# Patient Record
Sex: MALE | Race: WHITE | Employment: OTHER | URBAN - METROPOLITAN AREA
[De-identification: names, ages, dates, MRNs, and addresses within clinical notes are randomized per-mention and may not be internally consistent; named-entity substitution may affect disease eponyms.]

---

## 2017-08-17 ENCOUNTER — TELEPHONE (OUTPATIENT)
Dept: SURGERY | Facility: CLINIC | Age: 27
End: 2017-08-17

## 2017-08-17 ENCOUNTER — OFFICE VISIT (OUTPATIENT)
Dept: SURGERY | Facility: CLINIC | Age: 27
End: 2017-08-17

## 2017-08-17 VITALS
DIASTOLIC BLOOD PRESSURE: 70 MMHG | SYSTOLIC BLOOD PRESSURE: 108 MMHG | HEART RATE: 80 BPM | TEMPERATURE: 98 F | HEIGHT: 67 IN | WEIGHT: 155 LBS | BODY MASS INDEX: 24.33 KG/M2

## 2017-08-17 DIAGNOSIS — R35.1 NOCTURIA: ICD-10-CM

## 2017-08-17 DIAGNOSIS — R39.82 CHRONIC BLADDER PAIN: ICD-10-CM

## 2017-08-17 DIAGNOSIS — N30.90 CYSTITIS: Primary | ICD-10-CM

## 2017-08-17 LAB
BACTERIA UR QL AUTO: NEGATIVE /HPF
BILIRUB UR QL: NEGATIVE
CLARITY UR: CLEAR
COLOR UR: YELLOW
GLUCOSE UR-MCNC: NEGATIVE MG/DL
HGB UR QL STRIP.AUTO: NEGATIVE
KETONES UR-MCNC: NEGATIVE MG/DL
LEUKOCYTE ESTERASE UR QL STRIP.AUTO: NEGATIVE
NITRITE UR QL STRIP.AUTO: NEGATIVE
PH UR: 8 [PH] (ref 5–8)
PROT UR-MCNC: NEGATIVE MG/DL
RBC #/AREA URNS AUTO: 0 /HPF
SP GR UR STRIP: 1.01 (ref 1–1.03)
UROBILINOGEN UR STRIP-ACNC: <2
VIT C UR-MCNC: NEGATIVE MG/DL
WBC #/AREA URNS AUTO: 0 /HPF

## 2017-08-17 PROCEDURE — 99212 OFFICE O/P EST SF 10 MIN: CPT | Performed by: UROLOGY

## 2017-08-17 PROCEDURE — 99244 OFF/OP CNSLTJ NEW/EST MOD 40: CPT | Performed by: UROLOGY

## 2017-08-17 RX ORDER — CITALOPRAM 10 MG/1
10 TABLET ORAL DAILY
Refills: 4 | COMMUNITY
Start: 2017-08-13

## 2017-08-17 RX ORDER — TAMSULOSIN HYDROCHLORIDE 0.4 MG/1
0.4 CAPSULE ORAL DAILY
Qty: 30 CAPSULE | Refills: 3 | Status: SHIPPED | OUTPATIENT
Start: 2017-08-17 | End: 2017-09-16

## 2017-08-17 NOTE — PATIENT INSTRUCTIONS
1.  Your symptoms suggest chronic nonspecific cystitis disorder--could be viral; could be autoimmune; could be eosinophilic cystitis from some type of allergic reaction of the bladder.     2.  Urine specimen today for complete urinalysis, urine culture and [address], [place], [address] [employer]. All rights reserved. This information is not intended as a substitute for professional medical care. Always follow your healthcare professional's instructions.

## 2017-08-18 NOTE — PROGRESS NOTES
Frantz Loving is a 32year old male. HPI:   Patient presents with:  Abdominal Pain: Patient present for consult for lower abdominal pain for past 6 months. Pain 5/10 off/on. Pain goes away after urinating then comes back.  Was seeing Felipe Morgan in For suprapubic pain associated with a full bladder,  patient did see urologist Dr. Kathy Junior a few times in the office in 2017; according the patient, each time urinalysis was normal; no procedures were performed.   He was prescribed an antibiotic but it did Constitutional:  Negative for decreased activity, fever, irritability and lethargy  ENMT:  Negative for ear drainage, hearing loss and nasal drainage  Eyes:  Negative for eye discharge and vision loss  Hema/Lymph:  Negative for easy bleeding and easy bruis Skin/Hair: no unusual rashes present no abnormal bruising noted ; abdominal scars--none  Back/Spine: no abnormalities noted  Extremities: no cyanosis, or clubbing; edema--none    LABORATORIES      Recent Labs   01/06/15  1234   BUN 12   CREATSERUM 0.84   B Now improving on antidepressants and he feels he will be able to stop the antidepressant soon. Patient does not believe that his symptoms correlate with his recent depression. Urology      RECOMMENDATIONS AND TREATMENT PLAN      1.   Your symptoms suggest Sami Navarro is a very pleasant patient and I thoroughly enjoyed evaluating him  in consultation. I thank you for sending the patient to see me. I will do my best to keep you informed of  all significant urological findings and developments.   Thank you once a

## 2017-08-18 NOTE — TELEPHONE ENCOUNTER
Nurses, please ask  to change patient's primary MD in epic as being  DR. Guido Feliz MD  Thank you, Dr. Hines Ace

## 2017-08-21 ENCOUNTER — TELEPHONE (OUTPATIENT)
Dept: SURGERY | Facility: CLINIC | Age: 27
End: 2017-08-21

## 2017-08-21 DIAGNOSIS — R82.89 ABNORMAL URINE CYTOLOGY: Primary | ICD-10-CM

## 2017-08-21 NOTE — TELEPHONE ENCOUNTER
Phoned pt and spoke with him. Read to him 's instructions as outlined below in this encounter, in it's entirety.  Advised pt to check with his insurance to see if prior Mearl Lowe is required, and if so, to call clinic back so nurse can obtain this, and if

## 2017-08-21 NOTE — TELEPHONE ENCOUNTER
----- Message from Jayant Junior MD sent at 8/21/2017  6:52 AM CDT -----  Please call patient; urinalysis urine test showed no microscopic blood in urine culture showed no infection, however, urine for cytology showed atypical cells; interpretation of

## 2017-08-24 ENCOUNTER — HOSPITAL ENCOUNTER (OUTPATIENT)
Dept: ULTRASOUND IMAGING | Age: 27
Discharge: HOME | End: 2017-08-24
Attending: UROLOGY
Payer: COMMERCIAL

## 2017-08-24 DIAGNOSIS — R39.82 CHRONIC BLADDER PAIN: ICD-10-CM

## 2017-08-24 PROCEDURE — 76857 US EXAM PELVIC LIMITED: CPT | Performed by: UROLOGY

## 2017-08-25 ENCOUNTER — HOSPITAL ENCOUNTER (OUTPATIENT)
Dept: CT IMAGING | Age: 27
Discharge: HOME | End: 2017-08-25
Attending: UROLOGY
Payer: COMMERCIAL

## 2017-08-25 DIAGNOSIS — R82.89 ABNORMAL URINE CYTOLOGY: ICD-10-CM

## 2017-08-25 LAB — CREAT BLD-MCNC: 0.9 MG/DL (ref 0.5–1.5)

## 2017-08-25 PROCEDURE — 82565 ASSAY OF CREATININE: CPT

## 2017-08-25 PROCEDURE — 74178 CT ABD&PLV WO CNTR FLWD CNTR: CPT | Performed by: UROLOGY

## 2017-09-13 ENCOUNTER — TELEPHONE (OUTPATIENT)
Dept: SURGERY | Facility: CLINIC | Age: 27
End: 2017-09-13

## 2017-09-13 NOTE — TELEPHONE ENCOUNTER
Patient contacted and was informed of results per PVK. Patient was also informed that he had an upcoming appointment on 9/15/17 at 10am and if he had any questions at that time to please feel free ask questions.

## 2017-09-15 ENCOUNTER — OFFICE VISIT (OUTPATIENT)
Dept: SURGERY | Facility: CLINIC | Age: 27
End: 2017-09-15

## 2017-09-15 ENCOUNTER — TELEPHONE (OUTPATIENT)
Dept: SURGERY | Facility: CLINIC | Age: 27
End: 2017-09-15

## 2017-09-15 VITALS
HEIGHT: 67 IN | RESPIRATION RATE: 16 BRPM | HEART RATE: 80 BPM | TEMPERATURE: 98 F | WEIGHT: 158 LBS | DIASTOLIC BLOOD PRESSURE: 60 MMHG | BODY MASS INDEX: 24.8 KG/M2 | SYSTOLIC BLOOD PRESSURE: 110 MMHG

## 2017-09-15 DIAGNOSIS — R10.2 SUPRAPUBIC PAIN: ICD-10-CM

## 2017-09-15 DIAGNOSIS — R82.89 ABNORMAL URINE CYTOLOGY: Primary | ICD-10-CM

## 2017-09-15 DIAGNOSIS — R35.0 URINARY FREQUENCY: ICD-10-CM

## 2017-09-15 DIAGNOSIS — R10.2 MALE PELVIC PAIN: ICD-10-CM

## 2017-09-15 PROCEDURE — 99215 OFFICE O/P EST HI 40 MIN: CPT | Performed by: UROLOGY

## 2017-09-15 PROCEDURE — 99212 OFFICE O/P EST SF 10 MIN: CPT | Performed by: UROLOGY

## 2017-09-15 NOTE — PATIENT INSTRUCTIONS
1. Urine specimen today for cytology    2.   Intake voiding diary--please tabulated; my nurses will explain to; please bring it with you to the hospital.    3.  Proceed with plans for cystoscopy, possible biopsy of urinary tract, possible bilateral retrogr

## 2017-09-15 NOTE — TELEPHONE ENCOUNTER
Patient seen in office scheduled for cysto,biopsy of urine tract, possible bilateral rgpg, went over pre-op, patient submitted urine for cytology.

## 2017-09-15 NOTE — PROGRESS NOTES
HPI:    Patient ID: Selena Skinner is a 32year old male. HPI    Pelvic Pain  Location: suprapubic area; started 6 months ago; severity is 5/10; quality is dull discomfort.   Always feels it as soon as he wakes up in the morning; about two thirds of the greg improve symptoms. 3. Abnormal Laboratory Values  On 8/17/17  He received a UA which showed pH Urine - 8.0; WBC - 0; RBC - 0. On 8/17/17 Urine Cytology shows atypical urothelial cells present.  Patient is unaware of anything that made this problem kun Prescriptions:  Citalopram Hydrobromide 10 MG Oral Tab Take 10 mg by mouth daily. Disp:  Rfl: 4   Ketoconazole 2 % Apply Externally Shampoo Apply to scalp 2 times per week.  Disp: 120 mL Rfl: 12   tamsulosin HCl 0.4 MG Oral Cap Take 1 capsule (0.4 mg tota is unclear from this study; Multiple subcentimeter simple renal cysts-langent is 9mm in left kidney. 08/24/2017 US BLADDER - post void volume:   16 mL - empties bladder well; unremarkable urinary bladder.       I spent 40 minutes with patient, and [employer] starting an intake voiding diary and he agrees with this plan. Treatment Plan & Patient Instructions    1. Urine specimen today for cytology    2.   Intake voiding diary--please tabulated; my nurses will explain to; please bring it with you to the

## 2017-09-18 LAB — NON GYNE INTERPRETATION: NEGATIVE

## 2017-09-18 RX ORDER — THIAMINE HCL 100 MG
TABLET ORAL DAILY
COMMUNITY
End: 2018-09-05 | Stop reason: ALTCHOICE

## 2017-09-18 RX ORDER — SODIUM CHLORIDE, SODIUM LACTATE, POTASSIUM CHLORIDE, CALCIUM CHLORIDE 600; 310; 30; 20 MG/100ML; MG/100ML; MG/100ML; MG/100ML
INJECTION, SOLUTION INTRAVENOUS CONTINUOUS
Status: CANCELLED | OUTPATIENT
Start: 2017-09-18

## 2017-09-18 RX ORDER — MULTIVIT WITH MINERALS/LUTEIN
1000 TABLET ORAL DAILY
COMMUNITY
End: 2018-09-05 | Stop reason: ALTCHOICE

## 2017-09-21 ENCOUNTER — SURGERY (OUTPATIENT)
Age: 27
End: 2017-09-21

## 2017-09-21 ENCOUNTER — TELEPHONE (OUTPATIENT)
Dept: SURGERY | Facility: CLINIC | Age: 27
End: 2017-09-21

## 2017-09-21 ENCOUNTER — ANESTHESIA (OUTPATIENT)
Dept: SURGERY | Facility: HOSPITAL | Age: 27
End: 2017-09-21
Payer: COMMERCIAL

## 2017-09-21 ENCOUNTER — HOSPITAL ENCOUNTER (OUTPATIENT)
Facility: HOSPITAL | Age: 27
Setting detail: HOSPITAL OUTPATIENT SURGERY
Discharge: HOME | End: 2017-09-21
Attending: UROLOGY | Admitting: UROLOGY
Payer: COMMERCIAL

## 2017-09-21 ENCOUNTER — APPOINTMENT (OUTPATIENT)
Dept: GENERAL RADIOLOGY | Facility: HOSPITAL | Age: 27
End: 2017-09-21
Attending: UROLOGY
Payer: COMMERCIAL

## 2017-09-21 ENCOUNTER — ANESTHESIA EVENT (OUTPATIENT)
Dept: SURGERY | Facility: HOSPITAL | Age: 27
End: 2017-09-21
Payer: COMMERCIAL

## 2017-09-21 VITALS
TEMPERATURE: 97 F | HEART RATE: 72 BPM | HEIGHT: 66 IN | WEIGHT: 167.19 LBS | SYSTOLIC BLOOD PRESSURE: 110 MMHG | DIASTOLIC BLOOD PRESSURE: 78 MMHG | RESPIRATION RATE: 16 BRPM | BODY MASS INDEX: 26.87 KG/M2 | OXYGEN SATURATION: 98 %

## 2017-09-21 DIAGNOSIS — R39.89 BLADDER PAIN: ICD-10-CM

## 2017-09-21 DIAGNOSIS — N30.20: Primary | ICD-10-CM

## 2017-09-21 DIAGNOSIS — N42.1 HEMORRHAGE OF PROSTATE: ICD-10-CM

## 2017-09-21 PROCEDURE — BT141ZZ FLUOROSCOPY OF KIDNEYS, URETERS AND BLADDER USING LOW OSMOLAR CONTRAST: ICD-10-PCS | Performed by: UROLOGY

## 2017-09-21 PROCEDURE — 74420 UROGRAPHY RTRGR +-KUB: CPT | Performed by: UROLOGY

## 2017-09-21 PROCEDURE — 0V508ZZ DESTRUCTION OF PROSTATE, VIA NATURAL OR ARTIFICIAL OPENING ENDOSCOPIC: ICD-10-PCS | Performed by: UROLOGY

## 2017-09-21 PROCEDURE — 0T7B8ZZ DILATION OF BLADDER, VIA NATURAL OR ARTIFICIAL OPENING ENDOSCOPIC: ICD-10-PCS | Performed by: UROLOGY

## 2017-09-21 PROCEDURE — 52214 CYSTOSCOPY AND TREATMENT: CPT | Performed by: UROLOGY

## 2017-09-21 RX ORDER — LIDOCAINE HYDROCHLORIDE 20 MG/ML
JELLY TOPICAL AS NEEDED
Status: DISCONTINUED | OUTPATIENT
Start: 2017-09-21 | End: 2017-09-21 | Stop reason: HOSPADM

## 2017-09-21 RX ORDER — SODIUM CHLORIDE, SODIUM LACTATE, POTASSIUM CHLORIDE, CALCIUM CHLORIDE 600; 310; 30; 20 MG/100ML; MG/100ML; MG/100ML; MG/100ML
INJECTION, SOLUTION INTRAVENOUS CONTINUOUS PRN
Status: DISCONTINUED | OUTPATIENT
Start: 2017-09-21 | End: 2017-09-21 | Stop reason: SURG

## 2017-09-21 RX ORDER — MORPHINE SULFATE 4 MG/ML
4 INJECTION, SOLUTION INTRAMUSCULAR; INTRAVENOUS EVERY 10 MIN PRN
Status: DISCONTINUED | OUTPATIENT
Start: 2017-09-21 | End: 2017-09-21

## 2017-09-21 RX ORDER — HYDROCODONE BITARTRATE AND ACETAMINOPHEN 5; 325 MG/1; MG/1
2 TABLET ORAL EVERY 4 HOURS PRN
Status: CANCELLED | OUTPATIENT
Start: 2017-09-21

## 2017-09-21 RX ORDER — MIDAZOLAM HYDROCHLORIDE 1 MG/ML
INJECTION INTRAMUSCULAR; INTRAVENOUS AS NEEDED
Status: DISCONTINUED | OUTPATIENT
Start: 2017-09-21 | End: 2017-09-21 | Stop reason: SURG

## 2017-09-21 RX ORDER — ACETAMINOPHEN 325 MG/1
650 TABLET ORAL ONCE
Status: COMPLETED | OUTPATIENT
Start: 2017-09-21 | End: 2017-09-21

## 2017-09-21 RX ORDER — NALOXONE HYDROCHLORIDE 0.4 MG/ML
80 INJECTION, SOLUTION INTRAMUSCULAR; INTRAVENOUS; SUBCUTANEOUS AS NEEDED
Status: DISCONTINUED | OUTPATIENT
Start: 2017-09-21 | End: 2017-09-21 | Stop reason: HOSPADM

## 2017-09-21 RX ORDER — HYDROMORPHONE HYDROCHLORIDE 1 MG/ML
0.2 INJECTION, SOLUTION INTRAMUSCULAR; INTRAVENOUS; SUBCUTANEOUS EVERY 5 MIN PRN
Status: DISCONTINUED | OUTPATIENT
Start: 2017-09-21 | End: 2017-09-21

## 2017-09-21 RX ORDER — MORPHINE SULFATE 10 MG/ML
6 INJECTION, SOLUTION INTRAMUSCULAR; INTRAVENOUS EVERY 10 MIN PRN
Status: DISCONTINUED | OUTPATIENT
Start: 2017-09-21 | End: 2017-09-21

## 2017-09-21 RX ORDER — HYDROCODONE BITARTRATE AND ACETAMINOPHEN 5; 325 MG/1; MG/1
1 TABLET ORAL AS NEEDED
Status: DISCONTINUED | OUTPATIENT
Start: 2017-09-21 | End: 2017-09-21

## 2017-09-21 RX ORDER — PHENAZOPYRIDINE HYDROCHLORIDE 200 MG/1
200 TABLET, FILM COATED ORAL ONCE AS NEEDED
Status: CANCELLED | OUTPATIENT
Start: 2017-09-21 | End: 2017-09-21

## 2017-09-21 RX ORDER — HYDROCODONE BITARTRATE AND ACETAMINOPHEN 5; 325 MG/1; MG/1
2 TABLET ORAL AS NEEDED
Status: DISCONTINUED | OUTPATIENT
Start: 2017-09-21 | End: 2017-09-21

## 2017-09-21 RX ORDER — NALOXONE HYDROCHLORIDE 0.4 MG/ML
80 INJECTION, SOLUTION INTRAMUSCULAR; INTRAVENOUS; SUBCUTANEOUS AS NEEDED
Status: DISCONTINUED | OUTPATIENT
Start: 2017-09-21 | End: 2017-09-21

## 2017-09-21 RX ORDER — HYDROMORPHONE HYDROCHLORIDE 1 MG/ML
0.6 INJECTION, SOLUTION INTRAMUSCULAR; INTRAVENOUS; SUBCUTANEOUS EVERY 5 MIN PRN
Status: DISCONTINUED | OUTPATIENT
Start: 2017-09-21 | End: 2017-09-21

## 2017-09-21 RX ORDER — FAMOTIDINE 20 MG/1
20 TABLET ORAL ONCE
Status: DISCONTINUED | OUTPATIENT
Start: 2017-09-21 | End: 2017-09-21 | Stop reason: HOSPADM

## 2017-09-21 RX ORDER — PHENAZOPYRIDINE HYDROCHLORIDE 200 MG/1
200 TABLET, FILM COATED ORAL 3 TIMES DAILY PRN
Qty: 15 TABLET | Refills: 1 | Status: SHIPPED | OUTPATIENT
Start: 2017-09-21 | End: 2018-09-05 | Stop reason: ALTCHOICE

## 2017-09-21 RX ORDER — LIDOCAINE HYDROCHLORIDE 10 MG/ML
INJECTION, SOLUTION EPIDURAL; INFILTRATION; INTRACAUDAL; PERINEURAL AS NEEDED
Status: DISCONTINUED | OUTPATIENT
Start: 2017-09-21 | End: 2017-09-21 | Stop reason: SURG

## 2017-09-21 RX ORDER — MORPHINE SULFATE 2 MG/ML
2 INJECTION, SOLUTION INTRAMUSCULAR; INTRAVENOUS EVERY 10 MIN PRN
Status: DISCONTINUED | OUTPATIENT
Start: 2017-09-21 | End: 2017-09-21

## 2017-09-21 RX ORDER — SODIUM CHLORIDE, SODIUM LACTATE, POTASSIUM CHLORIDE, CALCIUM CHLORIDE 600; 310; 30; 20 MG/100ML; MG/100ML; MG/100ML; MG/100ML
INJECTION, SOLUTION INTRAVENOUS CONTINUOUS
Status: DISCONTINUED | OUTPATIENT
Start: 2017-09-21 | End: 2017-09-21

## 2017-09-21 RX ORDER — ACETAMINOPHEN 325 MG/1
650 TABLET ORAL EVERY 4 HOURS PRN
Status: CANCELLED | OUTPATIENT
Start: 2017-09-21

## 2017-09-21 RX ORDER — HYDROCODONE BITARTRATE AND ACETAMINOPHEN 5; 325 MG/1; MG/1
1 TABLET ORAL EVERY 4 HOURS PRN
Status: CANCELLED | OUTPATIENT
Start: 2017-09-21

## 2017-09-21 RX ORDER — ONDANSETRON 2 MG/ML
INJECTION INTRAMUSCULAR; INTRAVENOUS AS NEEDED
Status: DISCONTINUED | OUTPATIENT
Start: 2017-09-21 | End: 2017-09-21 | Stop reason: SURG

## 2017-09-21 RX ORDER — ONDANSETRON 2 MG/ML
4 INJECTION INTRAMUSCULAR; INTRAVENOUS ONCE AS NEEDED
Status: DISCONTINUED | OUTPATIENT
Start: 2017-09-21 | End: 2017-09-21

## 2017-09-21 RX ORDER — HYDROMORPHONE HYDROCHLORIDE 1 MG/ML
0.4 INJECTION, SOLUTION INTRAMUSCULAR; INTRAVENOUS; SUBCUTANEOUS EVERY 5 MIN PRN
Status: DISCONTINUED | OUTPATIENT
Start: 2017-09-21 | End: 2017-09-21

## 2017-09-21 RX ORDER — KETOROLAC TROMETHAMINE 30 MG/ML
INJECTION, SOLUTION INTRAMUSCULAR; INTRAVENOUS AS NEEDED
Status: DISCONTINUED | OUTPATIENT
Start: 2017-09-21 | End: 2017-09-21 | Stop reason: SURG

## 2017-09-21 RX ORDER — SODIUM CHLORIDE 9 MG/ML
INJECTION, SOLUTION INTRAVENOUS CONTINUOUS
Status: DISCONTINUED | OUTPATIENT
Start: 2017-09-21 | End: 2017-09-21

## 2017-09-21 RX ORDER — GLYCOPYRROLATE 0.2 MG/ML
INJECTION INTRAMUSCULAR; INTRAVENOUS AS NEEDED
Status: DISCONTINUED | OUTPATIENT
Start: 2017-09-21 | End: 2017-09-21 | Stop reason: SURG

## 2017-09-21 RX ORDER — HALOPERIDOL 5 MG/ML
0.25 INJECTION INTRAMUSCULAR ONCE AS NEEDED
Status: DISCONTINUED | OUTPATIENT
Start: 2017-09-21 | End: 2017-09-21

## 2017-09-21 RX ORDER — DEXAMETHASONE SODIUM PHOSPHATE 4 MG/ML
VIAL (ML) INJECTION AS NEEDED
Status: DISCONTINUED | OUTPATIENT
Start: 2017-09-21 | End: 2017-09-21 | Stop reason: SURG

## 2017-09-21 RX ADMIN — SODIUM CHLORIDE, SODIUM LACTATE, POTASSIUM CHLORIDE, CALCIUM CHLORIDE: 600; 310; 30; 20 INJECTION, SOLUTION INTRAVENOUS at 11:22:00

## 2017-09-21 RX ADMIN — ONDANSETRON 4 MG: 2 INJECTION INTRAMUSCULAR; INTRAVENOUS at 10:30:00

## 2017-09-21 RX ADMIN — LIDOCAINE HYDROCHLORIDE 25 MG: 10 INJECTION, SOLUTION EPIDURAL; INFILTRATION; INTRACAUDAL; PERINEURAL at 10:30:00

## 2017-09-21 RX ADMIN — GLYCOPYRROLATE 0.2 MG: 0.2 INJECTION INTRAMUSCULAR; INTRAVENOUS at 10:30:00

## 2017-09-21 RX ADMIN — KETOROLAC TROMETHAMINE 30 MG: 30 INJECTION, SOLUTION INTRAMUSCULAR; INTRAVENOUS at 11:22:00

## 2017-09-21 RX ADMIN — SODIUM CHLORIDE, SODIUM LACTATE, POTASSIUM CHLORIDE, CALCIUM CHLORIDE: 600; 310; 30; 20 INJECTION, SOLUTION INTRAVENOUS at 10:24:00

## 2017-09-21 RX ADMIN — DEXAMETHASONE SODIUM PHOSPHATE 4 MG: 4 MG/ML VIAL (ML) INJECTION at 10:30:00

## 2017-09-21 RX ADMIN — MIDAZOLAM HYDROCHLORIDE 2 MG: 1 INJECTION INTRAMUSCULAR; INTRAVENOUS at 10:30:00

## 2017-09-21 NOTE — OPERATIVE REPORT
[facility] [place], [place]    PATIENT'S NAME: Susy Culp PHYSICIAN: Topher Bonner MD   OPERATING PHYSICIAN: Topher Bonner MD   PATIENT ACCOUNT#:   [id number]    LOCATION:  [place] [address]  MEDICAL RECORD #:   [id number]       KACIE the bladder capacity is 600 mL. An additional 50 mL was entered after that. There were no glomerulations, and hence, no definite evidence of interstitial cystitis. Bladder capacity   mildly diminished.   The ureteral orifices are unremarkable in appearan up each side under fluoroscopy. The contrast used was 2/3 Isovue 300 and 1/3 sterile water with 80 mg gentamicin, and overhead pictures were obtained. Findings as above. At the end of the case, the bladder was emptied, and there was no bleeding.   I then

## 2017-09-21 NOTE — H&P
Progress Notes  Encounter Date: 9/15/2017     Nidia Cutler MD   UROLOGY      []Manual[]Template  []Copied  HPI:    Patient ID: Esperanza Miller is a 32year old male.     HPI     Pelvic Pain  Location: suprapubic area; started 6 months ago; severity is 5/ Patient has current AUA score of 8, moderate voiding dysfunction category.  Patient complains of sensation of not emptying bladder completely after finishing urinating, , urinary frequency less then 2 hours, intermittent stream, weak stream. Denies difficul Past Surgical History:  No date: APPENDECTOMY  No date: APPENDECTOMY           Family History   Problem Relation Age of Onset   • Heart Disorder Maternal Grandmother         Smoking status: Current Every Day Smoker Pulse: 80   Resp: 16   Temp: 98 °F (36.7 °C)   TempSrc: Oral   Weight: 158 lb (71.7 kg)   Height: 5' 7\" (1.702 m)            Body mass index is 24.75 kg/m².     LABORATORIES    8/17/17 UA; pH Urine - 8.0; WBC - 0; RBC - 0.  8/17/17 Urine Cytology shows at Patient presents with supra pubic pain which is slightly improved form last visit where he had groin, penis and suprapubic pain.  The patient commented how one month before symptoms, he started an antidepressant citalopram. While seeing the patient I called 6.  For this procedure, you must have a            Orders Placed This Encounter      Cytology, fluids -- specimen     Meds This Visit:  No prescriptions requested or ordered in this encounter     Imaging & Referrals:  None        ID#[id number]  By signing

## 2017-09-21 NOTE — BRIEF OP NOTE
[facility] POST ANESTHESIA CARE UNIT  Brief Op Note       Patients Name: Vitor Marilee  Attending Physician: Danette Huang MD  Operating Physician: Kirsten Dalton MD  CSN: [id number]     Location:  OR  MRN: [id number]    Date of Birth: [birth year]

## 2017-09-21 NOTE — ANESTHESIA POSTPROCEDURE EVALUATION
Patient: Narinder Zavaleta    Procedure Summary     Date:  09/21/17 Room / Location:  [address] [place] [address] MAIN OR 14 / [address] [place] [address] OR    Anesthesia Start:  1024 Anesthesia Stop:      Procedure:  CYSTOSCOPY RETROGRADE (N/A Ureter) Diagnosis:  (Abnormal urine cytology)    Surgeon:

## 2017-09-21 NOTE — INTERVAL H&P NOTE
Pre-op Diagnosis: Abnormal urine cytology    This morning I reviewed with patient the reasons for any indications for performing today's procedure; patient continues to intermittently feel suprapubic pain when the bladder is full and intermittently feels b

## 2017-09-21 NOTE — TELEPHONE ENCOUNTER
Urology update    9/21/17 cystoscopic procedure at the hospital--please see epic. The following her discharge instructions given to the patient =    1.   Phenazopyridine 200 mg 3 times daily as needed for burning pain with urination; makes urine a brigh

## 2017-09-21 NOTE — ANESTHESIA PREPROCEDURE EVALUATION
Anesthesia PreOp Note    HPI:     Thor Gill is a 32year old male who presents for preoperative consultation requested by: Brandon Gallegos MD    Date of Surgery: 9/21/2017    Procedure(s):  CYSTOSCOPY RETROGRADE  Indication: Abnormal urine cytology Maternal Grandmother        Social History  Social History   Marital status: Single  Spouse name: N/A    Years of education: N/A  Number of children: N/A     Occupational History  None on file     Social History Main Topics   Smoking status: Current Every

## 2018-04-07 ENCOUNTER — APPOINTMENT (OUTPATIENT)
Dept: CT IMAGING | Facility: HOSPITAL | Age: 28
End: 2018-04-07
Attending: EMERGENCY MEDICINE
Payer: COMMERCIAL

## 2018-04-07 ENCOUNTER — HOSPITAL ENCOUNTER (EMERGENCY)
Facility: HOSPITAL | Age: 28
Discharge: HOME | End: 2018-04-07
Attending: EMERGENCY MEDICINE
Payer: COMMERCIAL

## 2018-04-07 VITALS
RESPIRATION RATE: 16 BRPM | BODY MASS INDEX: 27 KG/M2 | TEMPERATURE: 98 F | WEIGHT: 168 LBS | DIASTOLIC BLOOD PRESSURE: 75 MMHG | OXYGEN SATURATION: 96 % | HEART RATE: 80 BPM | HEIGHT: 66 IN | SYSTOLIC BLOOD PRESSURE: 120 MMHG

## 2018-04-07 DIAGNOSIS — R10.9 ABDOMINAL PAIN, LEFT LATERAL: Primary | ICD-10-CM

## 2018-04-07 PROCEDURE — 74176 CT ABD & PELVIS W/O CONTRAST: CPT | Performed by: EMERGENCY MEDICINE

## 2018-04-07 PROCEDURE — 80053 COMPREHEN METABOLIC PANEL: CPT | Performed by: EMERGENCY MEDICINE

## 2018-04-07 PROCEDURE — 81003 URINALYSIS AUTO W/O SCOPE: CPT | Performed by: EMERGENCY MEDICINE

## 2018-04-07 PROCEDURE — 85025 COMPLETE CBC W/AUTO DIFF WBC: CPT | Performed by: EMERGENCY MEDICINE

## 2018-04-07 PROCEDURE — 36415 COLL VENOUS BLD VENIPUNCTURE: CPT

## 2018-04-07 PROCEDURE — 83690 ASSAY OF LIPASE: CPT | Performed by: EMERGENCY MEDICINE

## 2018-04-07 PROCEDURE — 99284 EMERGENCY DEPT VISIT MOD MDM: CPT

## 2018-04-07 RX ORDER — ONDANSETRON 4 MG/1
4 TABLET, ORALLY DISINTEGRATING ORAL EVERY 4 HOURS PRN
Qty: 10 TABLET | Refills: 0 | Status: SHIPPED | OUTPATIENT
Start: 2018-04-07 | End: 2018-04-14

## 2018-04-07 RX ORDER — DICYCLOMINE HCL 20 MG
20 TABLET ORAL 4 TIMES DAILY PRN
Qty: 15 TABLET | Refills: 0 | Status: SHIPPED | OUTPATIENT
Start: 2018-04-07 | End: 2018-09-05 | Stop reason: ALTCHOICE

## 2018-04-07 RX ORDER — SODIUM CHLORIDE 9 MG/ML
1000 INJECTION, SOLUTION INTRAVENOUS ONCE
Status: DISCONTINUED | OUTPATIENT
Start: 2018-04-07 | End: 2018-04-07

## 2018-04-07 NOTE — ED PROVIDER NOTES
Patient Seen in: [facility] Emergency Department    History   Patient presents with:  Abdomen/Flank Pain (GI/)  Nausea/Vomiting/Diarrhea (gastrointestinal)    Stated Complaint: abd pain, weak, nausea    HPI    Patient is a 19-year-old male present 16  Temp: 98 °F (36.7 °C)  Temp src: Temporal  SpO2: 98 %  O2 Device: None (Room air)    Current:/66   Pulse 80   Temp 98 °F (36.7 °C) (Temporal)   Resp 16   Ht 167.6 cm (5' 6\")   Wt 76.2 kg   SpO2 96%   BMI 27.12 kg/m²         Physical Exam  GENERA PLATELET    Narrative: The following orders were created for panel order CBC WITH DIFFERENTIAL WITH PLATELET.   Procedure                               Abnormality         Status                     ---------                               ----------- lateral  (primary encounter diagnosis)    Disposition:  Discharge  4/7/2018  7:04 pm    Follow-up:  Andressa Alba MD  [address] [address]    Call in 2 days  [facility]        Medications Prescribe

## 2018-09-05 ENCOUNTER — NURSE ONLY (OUTPATIENT)
Dept: ALLERGY | Facility: CLINIC | Age: 28
End: 2018-09-05
Payer: COMMERCIAL

## 2018-09-05 ENCOUNTER — OFFICE VISIT (OUTPATIENT)
Dept: ALLERGY | Facility: CLINIC | Age: 28
End: 2018-09-05
Payer: COMMERCIAL

## 2018-09-05 ENCOUNTER — APPOINTMENT (OUTPATIENT)
Dept: LAB | Age: 28
End: 2018-09-05
Attending: ALLERGY & IMMUNOLOGY
Payer: COMMERCIAL

## 2018-09-05 VITALS
DIASTOLIC BLOOD PRESSURE: 70 MMHG | RESPIRATION RATE: 16 BRPM | HEART RATE: 84 BPM | TEMPERATURE: 98 F | BODY MASS INDEX: 27.31 KG/M2 | WEIGHT: 174 LBS | SYSTOLIC BLOOD PRESSURE: 110 MMHG | HEIGHT: 67 IN

## 2018-09-05 DIAGNOSIS — N32.9 BLADDER DISORDER: ICD-10-CM

## 2018-09-05 DIAGNOSIS — J30.1 SEASONAL ALLERGIC RHINITIS DUE TO POLLEN: Primary | ICD-10-CM

## 2018-09-05 DIAGNOSIS — J30.89 ENVIRONMENTAL AND SEASONAL ALLERGIES: ICD-10-CM

## 2018-09-05 DIAGNOSIS — Z91.018 FOOD ALLERGY: ICD-10-CM

## 2018-09-05 DIAGNOSIS — J30.81 ALLERGIC RHINITIS DUE TO ANIMALS: ICD-10-CM

## 2018-09-05 LAB
BACTERIA UR QL AUTO: NEGATIVE /HPF
CRP SERPL-MCNC: 0.6 MG/DL (ref 0–0.9)
ERYTHROCYTE [SEDIMENTATION RATE] IN BLOOD: 6 MM/HR (ref 0–15)
RBC #/AREA URNS AUTO: <1 /HPF
WBC #/AREA URNS AUTO: 1 /HPF

## 2018-09-05 PROCEDURE — 85652 RBC SED RATE AUTOMATED: CPT

## 2018-09-05 PROCEDURE — 81015 MICROSCOPIC EXAM OF URINE: CPT

## 2018-09-05 PROCEDURE — 86140 C-REACTIVE PROTEIN: CPT

## 2018-09-05 PROCEDURE — 95004 PERQ TESTS W/ALRGNC XTRCS: CPT | Performed by: ALLERGY & IMMUNOLOGY

## 2018-09-05 PROCEDURE — 83516 IMMUNOASSAY NONANTIBODY: CPT

## 2018-09-05 PROCEDURE — 86255 FLUORESCENT ANTIBODY SCREEN: CPT

## 2018-09-05 PROCEDURE — 86038 ANTINUCLEAR ANTIBODIES: CPT

## 2018-09-05 PROCEDURE — 36415 COLL VENOUS BLD VENIPUNCTURE: CPT

## 2018-09-05 PROCEDURE — 99212 OFFICE O/P EST SF 10 MIN: CPT | Performed by: ALLERGY & IMMUNOLOGY

## 2018-09-05 PROCEDURE — 95024 IQ TESTS W/ALLERGENIC XTRCS: CPT | Performed by: ALLERGY & IMMUNOLOGY

## 2018-09-05 PROCEDURE — 99244 OFF/OP CNSLTJ NEW/EST MOD 40: CPT | Performed by: ALLERGY & IMMUNOLOGY

## 2018-09-05 PROCEDURE — 83876 ASSAY MYELOPEROXIDASE: CPT

## 2018-09-05 NOTE — PROGRESS NOTES
Josué Machuca is a 29year old male. HPI:   Patient presents with:   Allergies: Consult - Urologist refer    Patient is a 60-year-old male who presents for allergy consultation upon referral of his urologist, Dr. Strong Melony     Patient with prior urol Take by mouth daily. Disp:  Rfl:    Omega-3 Fatty Acids (FISH OIL) 1360 MG Oral Cap Take by mouth daily. Disp:  Rfl:    Citalopram Hydrobromide 10 MG Oral Tab Take 10 mg by mouth daily.    Disp:  Rfl: 4       Allergies:    Shellfish-Derived P*    ITCHING ASSESSMENT/PLAN:   Assessment   Seasonal allergic rhinitis due to pollen  (primary encounter diagnosis)  Allergic rhinitis due to animals  Bladder disorder  Food allergy    Skin testing today to environmental allergens to screen for allergic triggers was prescriptions requested or ordered in this encounter    Imaging & Referrals:  None     9/5/2018  Ye Piper MD      If medication samples were provided today, they were provided solely for patient education and training related to self administratio

## 2018-09-06 LAB — NUCLEAR IGG TITR SER IF: NEGATIVE {TITER}

## 2018-09-07 ENCOUNTER — TELEPHONE (OUTPATIENT)
Dept: ALLERGY | Facility: CLINIC | Age: 28
End: 2018-09-07

## 2018-09-07 NOTE — TELEPHONE ENCOUNTER
Notes recorded by Zhanna Singh MD on 9/6/2018 at 12:20 PM CDT  Please call pt with normal SARY, this is good news  ------    Notes recorded by Zahnna Singh MD on 9/6/2018 at 7:20 AM CDT  Please call pt with radha esr , crp  And UA .  Sary still pend

## 2018-09-08 NOTE — TELEPHONE ENCOUNTER
Pt contacted, given results as below. Informed that testing showed not problems with urine, showed negative for inflammation. Testing shows no sign of disease. Informed this is good new per Dr. Gokul Hurd. Pt verbalized understanding of information.

## 2018-09-09 LAB
MYELOPEROX ANTIBODIES, IGG: 0 AU/ML
SERINE PROTEASE3, IGG: 0 AU/ML

## 2018-09-12 ENCOUNTER — TELEPHONE (OUTPATIENT)
Dept: ALLERGY | Facility: CLINIC | Age: 28
End: 2018-09-12

## 2018-09-12 NOTE — TELEPHONE ENCOUNTER
Spoke to patient. Verified patient's name & . Reviewed w/ patient the following message below per Dr. Gokul Hurd. Also, reviewed lab results per Dr. Gokul Hurd under telephone encounter from 18.     Mailed home copy of test results per patient's request. Ve

## 2018-09-12 NOTE — TELEPHONE ENCOUNTER
----- Message from Ella Bryan MD sent at 9/10/2018  7:32 AM CDT -----  Please call patient with unremarkable ANCA panel. This is good news.   No signs of vasculitis

## 2018-09-19 ENCOUNTER — TELEPHONE (OUTPATIENT)
Dept: SURGERY | Facility: CLINIC | Age: 28
End: 2018-09-19

## 2018-09-19 DIAGNOSIS — N39.0 URINARY TRACT INFECTION WITHOUT HEMATURIA, SITE UNSPECIFIED: Primary | ICD-10-CM

## 2018-09-19 DIAGNOSIS — N50.819 TESTICULAR PAIN: ICD-10-CM

## 2018-09-19 NOTE — TELEPHONE ENCOUNTER
I spoke with pt and determined that he has c/o Lt sided testicular pain at level 3-4 which is a constant dull aching pain. States that there is mild swelling but denies tenderness to touch, or  reddened warm to touch skin at that area.  Denies any urinary s allergist for allergy testing; if he wishes to see an allergist in this area, I recommend Dr. Que Patel, [facility]/  [facility] in [facility], or patient may seek from his primary physician Dr. Cristiane Alvarado referral to allergist in the [facility] or

## 2018-09-20 NOTE — TELEPHONE ENCOUNTER
Spoke to patient. Relayed Dr. Julianna Taylor' message/recommendations below to the patient. Patient states he saw another urologist yesterday and did some testing. No further action required at this time.

## 2018-12-05 ENCOUNTER — OFFICE VISIT (OUTPATIENT)
Dept: SURGERY | Facility: CLINIC | Age: 28
End: 2018-12-05
Payer: COMMERCIAL

## 2018-12-05 VITALS
BODY MASS INDEX: 27 KG/M2 | SYSTOLIC BLOOD PRESSURE: 118 MMHG | HEART RATE: 74 BPM | TEMPERATURE: 98 F | WEIGHT: 170 LBS | DIASTOLIC BLOOD PRESSURE: 70 MMHG

## 2018-12-05 DIAGNOSIS — N30.20: Primary | ICD-10-CM

## 2018-12-05 DIAGNOSIS — N30.00 ACUTE CYSTITIS WITHOUT HEMATURIA: ICD-10-CM

## 2018-12-05 PROCEDURE — 99212 OFFICE O/P EST SF 10 MIN: CPT | Performed by: UROLOGY

## 2018-12-05 PROCEDURE — 99215 OFFICE O/P EST HI 40 MIN: CPT | Performed by: UROLOGY

## 2018-12-05 NOTE — PROGRESS NOTES
HPI:    Patient ID: Adriel Rosas is a 29year old male. HPI     History provided by patient; [place] translation provided by me    1. Suprapubic Pain  Location:  Suprapubic region  Duration: on and off for the past year.  Started consistently in [facility] showing nonspecific vasculitis, which was likely the cause of atypical cytology    4.  Multiple Allergies  Pt saw allergist Dr. aMdi Bennett most recently on September 2018, and allergy testing proved he has >10 different allergies    Chart Review:  7/2/15 patient retrograde pyelograms unremarkable, might be due to some type of autoimmune abnormality or possibly and allergic-type reaction, recommend to patient that he seek opinion from an allergist.  9/05/2018 visit with Allergist Dr Donald Alcala: \"Skin tests show Date   • APPENDECTOMY     • APPENDECTOMY     • CYSTOSCOPY RETROGRADE N/A 9/21/2017    Performed by Aldo Ochoa MD at [address] [place] [address] MAIN OR   • OTHER SURGICAL HISTORY  09/21/2017    cystoscopy      Family History   Problem Relation Age of Onset   • Heart Disor nonenlarged mesenteric lymph nodes are noted, possibility of mesenteric adenitis is a consideration, though this is considered less likely, no evidence of an acute inflammatory process.     08/25/2017 CT UROGRAM - No evidence of nephroureterolithiasis, obst & Patient Instructions    1. Urine specimen today for complete urinalysis and urine culture--we will notify you of the results whether normal or abnormal.  Antibiotics only if urine culture comes back positive for bacteria infection.     2.  Most likely t

## 2018-12-05 NOTE — PATIENT INSTRUCTIONS
Shadia Mauro M.D.      1.   Urine specimen today for complete urinalysis and urine culture--we will notify you of the results whether normal or abnormal.  Antibiotics only if urine culture comes back positive for bacteria i

## 2018-12-07 ENCOUNTER — TELEPHONE (OUTPATIENT)
Dept: SURGERY | Facility: CLINIC | Age: 28
End: 2018-12-07

## 2019-02-11 ENCOUNTER — APPOINTMENT (OUTPATIENT)
Dept: LAB | Age: 29
End: 2019-02-11
Attending: UROLOGY
Payer: COMMERCIAL

## 2019-02-11 ENCOUNTER — TELEPHONE (OUTPATIENT)
Dept: SURGERY | Facility: CLINIC | Age: 29
End: 2019-02-11

## 2019-02-11 DIAGNOSIS — N30.20: ICD-10-CM

## 2019-02-11 DIAGNOSIS — R30.0 DYSURIA: Primary | ICD-10-CM

## 2019-02-11 DIAGNOSIS — R30.0 DYSURIA: ICD-10-CM

## 2019-02-11 LAB
BILIRUB UR QL: NEGATIVE
CLARITY UR: CLEAR
COLOR UR: YELLOW
GLUCOSE UR-MCNC: NEGATIVE MG/DL
HGB UR QL STRIP.AUTO: NEGATIVE
KETONES UR-MCNC: NEGATIVE MG/DL
LEUKOCYTE ESTERASE UR QL STRIP.AUTO: NEGATIVE
NITRITE UR QL STRIP.AUTO: NEGATIVE
PH UR: 7 [PH] (ref 5–8)
PROT UR-MCNC: NEGATIVE MG/DL
SP GR UR STRIP: 1.01 (ref 1–1.03)
UROBILINOGEN UR STRIP-ACNC: <2
VIT C UR-MCNC: NEGATIVE MG/DL

## 2019-02-11 PROCEDURE — 87086 URINE CULTURE/COLONY COUNT: CPT

## 2019-02-11 PROCEDURE — 81003 URINALYSIS AUTO W/O SCOPE: CPT

## 2019-02-11 NOTE — TELEPHONE ENCOUNTER
lov 12/05/18. fov 3/13/19. Returned pt's call and spoke with him. He is describing suprapubic pain for the past 5-6 days. Denies visible blood or fever, however states sometimes he sweats from the pain.  Describes it as a \"5\" on pain scale, and is interm Lev     4.  Visit in 3 months.   Please submit urine specimen for complete urinalysis and urine culture 3--10 days before visit as well.

## 2019-02-13 NOTE — TELEPHONE ENCOUNTER
Spoke with pt and informed him of TATIANA's msg below and I gave pt an appt for tomorrow, 2/14 at 1 pm.

## 2019-02-13 NOTE — TELEPHONE ENCOUNTER
I called pt back as PVK gave instructions to see if pt can come in at 12:40 pm tomorrow instead of 1 pm and pt agreed.

## 2019-02-13 NOTE — TELEPHONE ENCOUNTER
Please call patient back and give him an appointment to see me; if no openings, look at Thursdays with Vinita Cohn at the . I need to see him in person and examined him before making a decision to put patient to sleep for a cystoscopic surgery.   Also p

## 2019-02-14 ENCOUNTER — OFFICE VISIT (OUTPATIENT)
Dept: SURGERY | Facility: CLINIC | Age: 29
End: 2019-02-14
Payer: COMMERCIAL

## 2019-02-14 VITALS
WEIGHT: 170 LBS | HEART RATE: 67 BPM | SYSTOLIC BLOOD PRESSURE: 103 MMHG | DIASTOLIC BLOOD PRESSURE: 68 MMHG | BODY MASS INDEX: 26.68 KG/M2 | HEIGHT: 67 IN

## 2019-02-14 DIAGNOSIS — N30.20: ICD-10-CM

## 2019-02-14 DIAGNOSIS — R39.89 BLADDER PAIN: Primary | ICD-10-CM

## 2019-02-14 DIAGNOSIS — R39.12 WEAK URINARY STREAM: ICD-10-CM

## 2019-02-14 PROCEDURE — 99212 OFFICE O/P EST SF 10 MIN: CPT | Performed by: UROLOGY

## 2019-02-14 PROCEDURE — 99215 OFFICE O/P EST HI 40 MIN: CPT | Performed by: UROLOGY

## 2019-02-14 NOTE — PATIENT INSTRUCTIONS
Adan Johnson M.D.        1.   When you start experiencing bladder pain, start taking Aleve twice daily with food.   Aleve as an anti-inflammatory agent and a flareup of allergic type inflammation that might be occurring in y

## 2019-02-15 NOTE — PROGRESS NOTES
HPI:    Patient ID: Julian Butt is a 29year old male. HPI     History provided by patient; [place] translation provided by me    1. Suprapubic Pain  Location:  Suprapubic region  Duration: on and off for the past year. Started 2017.   Patient states t medications. The patient feels that the voiding dysfunction is stable compared to last time. Patient states he feels \"most dissatisfied\" about his urinating problem.     3.  Chronic nonspecific cystitis   9/21/2017 pt underwent cystoscopy showing nonspeci bleeding during the case and has to be fulgurated to stop prostate bleeding/hemorrhage, prostatic urethra is 2.5 cm, minimal prostatic obstruction, no intravesical medial lobe, bladder 1 to 2/4+ trabeculated, increased vessel pattern, vasculitis, moderate hematuria (Gross). Positive for urinary urgency, urinary frequency less then 2 hours,  intermittent stream, weak stream, urinary hesitancy and nocturia 0-1   Skin: Negative for color change. Neurological: Negative for speech difficulty.    Psychiat Less than 1 time in 5  Over the past month, how often have you found it difficult to postpone urination?: Not at all  Over the past month, how often have you had a weak urinary stream?: Half the time  Over the past month, how often have you had to push or obstruction, or calcification; urinary bladder: no visible focal wall thickening; abdominal wall: tiny at containing umbilical hernia; Pelvic: no adenopathy; Multiple nonenlarged mesenteric lymph nodes are noted, possibility of mesenteric adenitis is a con cat dog, cr, and feathers. I saw patient December 2018 and symptoms improved after that.   I suspect  non-specific bladder symptoms to be a flareup of a chronic vasculitis of the bladder/nonspecific cystitis which could likely be a manifestation of 1 of hi prescription bladder medications such as Myrbetriq or tamsulosin in the future; tamsulosin is more successful for problems with weak stream.  Myrbetriq is prescribed to increase capacity of the bladder to hold urine. 6.  Visit in 6 months.   Please submi

## 2019-03-13 ENCOUNTER — OFFICE VISIT (OUTPATIENT)
Dept: SURGERY | Facility: CLINIC | Age: 29
End: 2019-03-13
Payer: COMMERCIAL

## 2019-03-13 VITALS
BODY MASS INDEX: 26.68 KG/M2 | HEART RATE: 68 BPM | WEIGHT: 170 LBS | SYSTOLIC BLOOD PRESSURE: 118 MMHG | HEIGHT: 67 IN | TEMPERATURE: 98 F | DIASTOLIC BLOOD PRESSURE: 79 MMHG

## 2019-03-13 DIAGNOSIS — R10.2 CHRONIC MALE PELVIC PAIN: ICD-10-CM

## 2019-03-13 DIAGNOSIS — N30.20: Primary | ICD-10-CM

## 2019-03-13 DIAGNOSIS — G89.29 CHRONIC MALE PELVIC PAIN: ICD-10-CM

## 2019-03-13 LAB
BACTERIA UR QL AUTO: NEGATIVE /HPF
BILIRUB UR QL: NEGATIVE
CLARITY UR: CLEAR
COLOR UR: YELLOW
GLUCOSE UR-MCNC: NEGATIVE MG/DL
HGB UR QL STRIP.AUTO: NEGATIVE
KETONES UR-MCNC: NEGATIVE MG/DL
LEUKOCYTE ESTERASE UR QL STRIP.AUTO: NEGATIVE
NITRITE UR QL STRIP.AUTO: NEGATIVE
PH UR: 6 [PH] (ref 5–8)
PROT UR-MCNC: NEGATIVE MG/DL
RBC #/AREA URNS AUTO: <1 /HPF
SP GR UR STRIP: 1.02 (ref 1–1.03)
UROBILINOGEN UR STRIP-ACNC: <2
VIT C UR-MCNC: 20 MG/DL
WBC #/AREA URNS AUTO: 1 /HPF

## 2019-03-13 PROCEDURE — 99212 OFFICE O/P EST SF 10 MIN: CPT | Performed by: UROLOGY

## 2019-03-13 PROCEDURE — 99214 OFFICE O/P EST MOD 30 MIN: CPT | Performed by: UROLOGY

## 2019-03-13 NOTE — PROGRESS NOTES
HPI:    Patient ID: Steffi Ortiz is a 34year old male. HPI     History is provided by patient and translated by Dr. Negrito Lara. Suprapubic Pain  Started March 2017. Patient complains of intermittent suprapubic pain. Currently his pain is a 5/10.  Patient sta herbal medications and herbal teas for stress and anxiety. Multiple somatic complaints   Patient also complains of aches and ailments of non-urology organ systems and in different parts of his body.       Chart Review:  7/2/15 patient saw internist Shraddha Durant unremarkable, might be due to some type of autoimmune abnormality or possibly and allergic-type reaction, recommend to patient that he seek opinion from an allergist.  9/05/2018 visit with Allergist Dr Johnson Bolanos: \"Skin tests show allergies to tree, gr emptying bladder, urinary frequency less than 2 hours, intermittent stream, weak stream,  and nocturia 1x   Musculoskeletal:        Intermittent bilateral low back pain. Skin: Negative for color change. Neurological: Negative for speech difficulty.    P murmur heard. Pulmonary/Chest: Effort normal and breath sounds normal. No respiratory distress. He has no wheezes. He has no rales.    Abdominal:   Flanks non-tender to percussion or palpation; no inguinal hernias   Genitourinary:   Genitourinary Comments: patient's symptoms is unclear from this study; Multiple subcentimeter simple renal cysts-langent is 9mm in left kidney. 08/24/2017 US BLADDER - post void volume:   16 mL - empties bladder well; unremarkable urinary bladder.          ASSESSMENT/PLAN: biopsy vs observation and I answered patient's questions on treatment; patient understands all of this and decides to re-evaluate in August 2019.    (R10.2,  G89.29) Chronic male pelvic pain  See above.       I explained to patient the risks, side effects, Alicia Schultz, 3/13/2019, 9:48 AM.    I, Natalia Goodwin MD,  personally performed the services described in this documentation. All medical record entries made by the scribe were at my direction and in my presence.   I have reviewed the chart and di

## 2019-03-13 NOTE — PATIENT INSTRUCTIONS
Clemente Garrett M.D.    1.  Though we are not unable to precisely diagnose exactly what is causing your pain, it is likely that depression can make pain diseases and disorders worse.   For that reason I continue to recommend th

## 2019-03-18 ENCOUNTER — TELEPHONE (OUTPATIENT)
Dept: SURGERY | Facility: CLINIC | Age: 29
End: 2019-03-18

## 2019-03-18 NOTE — TELEPHONE ENCOUNTER
----- Message from GUZMAN Loja sent at 3/14/2019 11:14 AM CDT -----  Staff,    Please let patient know his UA is normal . Plan to follow up with PVK in August 2019    Thank you,  [address] [place] [address] Clinic-Urology

## 2019-12-02 ENCOUNTER — TELEPHONE (OUTPATIENT)
Dept: SURGERY | Facility: CLINIC | Age: 29
End: 2019-12-02

## 2019-12-02 NOTE — TELEPHONE ENCOUNTER
Pt called stating pt is having pain and wants to be checked for std. Pt has had unprotected sex. Can pt get an order.   Call

## 2023-05-03 ENCOUNTER — OFFICE VISIT (OUTPATIENT)
Facility: LOCATION | Age: 33
End: 2023-05-03
Payer: COMMERCIAL

## 2023-05-03 DIAGNOSIS — H93.19 TINNITUS, UNSPECIFIED LATERALITY: ICD-10-CM

## 2023-05-03 DIAGNOSIS — H61.23 BILATERAL IMPACTED CERUMEN: Primary | ICD-10-CM

## 2023-05-03 DIAGNOSIS — H93.293 ABNORMAL AUDITORY PERCEPTION OF BOTH EARS: Primary | ICD-10-CM

## 2023-05-03 PROCEDURE — 92557 COMPREHENSIVE HEARING TEST: CPT | Performed by: AUDIOLOGIST

## 2023-05-03 PROCEDURE — 92504 EAR MICROSCOPY EXAMINATION: CPT | Performed by: OTOLARYNGOLOGY

## 2023-05-03 PROCEDURE — 92567 TYMPANOMETRY: CPT | Performed by: AUDIOLOGIST

## 2023-05-03 PROCEDURE — 99204 OFFICE O/P NEW MOD 45 MIN: CPT | Performed by: OTOLARYNGOLOGY

## 2023-05-03 RX ORDER — FLUTICASONE PROPIONATE 50 MCG
2 SPRAY, SUSPENSION (ML) NASAL DAILY
Qty: 16 G | Refills: 3 | Status: SHIPPED | OUTPATIENT
Start: 2023-05-03

## 2023-05-03 RX ORDER — METHYLPREDNISOLONE 4 MG/1
TABLET ORAL
Qty: 21 TABLET | Refills: 0 | Status: SHIPPED | OUTPATIENT
Start: 2023-05-03

## 2023-05-03 NOTE — PROGRESS NOTES
Leslie Conner was seen for audiometric evaluation today. Referred back to physician.      Lida Alvarez

## 2023-05-05 ENCOUNTER — TELEPHONE (OUTPATIENT)
Facility: LOCATION | Age: 33
End: 2023-05-05

## 2023-05-05 NOTE — TELEPHONE ENCOUNTER
Pt called. Pt seen 5/3/23. Pt states 'was given a steroid pack, took medication and now has herpes infection.  Would like a call back from Dr. Nathan Ovalles.'       Pt [phone]

## 2023-05-05 NOTE — TELEPHONE ENCOUNTER
Called patient regarding question he had taken 1 day of the Medrol Josechepe Bolanos feels as though this may have flared up a herpes infection. I told him to discontinue the medication then stated he continues to have tinnitus I explained to him that is a problem that there is really no cure for however I did recommend a tinnitus masker. Patient seem to get upset and hung up the phone.

## 2023-06-13 ENCOUNTER — OFFICE VISIT (OUTPATIENT)
Dept: INTERNAL MEDICINE CLINIC | Facility: CLINIC | Age: 33
End: 2023-06-13
Payer: COMMERCIAL

## 2023-06-13 VITALS — SYSTOLIC BLOOD PRESSURE: 98 MMHG | HEART RATE: 68 BPM | DIASTOLIC BLOOD PRESSURE: 64 MMHG | TEMPERATURE: 97 F

## 2023-06-13 DIAGNOSIS — Z00.00 ROUTINE GENERAL MEDICAL EXAMINATION AT A HEALTH CARE FACILITY: Primary | ICD-10-CM

## 2023-06-13 DIAGNOSIS — H93.11 RIGHT-SIDED TINNITUS: ICD-10-CM

## 2023-06-13 DIAGNOSIS — E55.9 VITAMIN D DEFICIENCY: ICD-10-CM

## 2023-06-13 DIAGNOSIS — Z13.0 SCREENING FOR BLOOD DISEASE: ICD-10-CM

## 2023-06-13 DIAGNOSIS — Z13.220 SCREENING FOR LIPID DISORDERS: ICD-10-CM

## 2023-06-13 DIAGNOSIS — Z13.29 SCREENING FOR THYROID DISORDER: ICD-10-CM

## 2023-06-13 DIAGNOSIS — Z13.228 SCREENING FOR METABOLIC DISORDER: ICD-10-CM

## 2023-06-13 PROCEDURE — 99395 PREV VISIT EST AGE 18-39: CPT | Performed by: INTERNAL MEDICINE

## 2023-06-13 PROCEDURE — 3078F DIAST BP <80 MM HG: CPT | Performed by: INTERNAL MEDICINE

## 2023-06-13 PROCEDURE — 3074F SYST BP LT 130 MM HG: CPT | Performed by: INTERNAL MEDICINE

## 2023-06-16 ENCOUNTER — LAB ENCOUNTER (OUTPATIENT)
Dept: LAB | Age: 33
End: 2023-06-16
Attending: INTERNAL MEDICINE
Payer: COMMERCIAL

## 2023-06-16 DIAGNOSIS — Z13.29 SCREENING FOR THYROID DISORDER: ICD-10-CM

## 2023-06-16 DIAGNOSIS — Z13.220 SCREENING FOR LIPID DISORDERS: ICD-10-CM

## 2023-06-16 DIAGNOSIS — Z00.00 ROUTINE GENERAL MEDICAL EXAMINATION AT A HEALTH CARE FACILITY: ICD-10-CM

## 2023-06-16 DIAGNOSIS — Z13.0 SCREENING FOR BLOOD DISEASE: ICD-10-CM

## 2023-06-16 DIAGNOSIS — E55.9 VITAMIN D DEFICIENCY: ICD-10-CM

## 2023-06-16 DIAGNOSIS — Z13.228 SCREENING FOR METABOLIC DISORDER: ICD-10-CM

## 2023-06-16 LAB
ALBUMIN SERPL-MCNC: 4 G/DL (ref 3.4–5)
ALBUMIN/GLOB SERPL: 1 {RATIO} (ref 1–2)
ALP LIVER SERPL-CCNC: 66 U/L
ALT SERPL-CCNC: 56 U/L
ANION GAP SERPL CALC-SCNC: 6 MMOL/L (ref 0–18)
AST SERPL-CCNC: 23 U/L (ref 15–37)
BASOPHILS # BLD AUTO: 0.09 X10(3) UL (ref 0–0.2)
BASOPHILS NFR BLD AUTO: 1.2 %
BILIRUB SERPL-MCNC: 0.7 MG/DL (ref 0.1–2)
BUN BLD-MCNC: 15 MG/DL (ref 7–18)
CALCIUM BLD-MCNC: 9.5 MG/DL (ref 8.5–10.1)
CHLORIDE SERPL-SCNC: 105 MMOL/L (ref 98–112)
CHOLEST SERPL-MCNC: 231 MG/DL (ref ?–200)
CO2 SERPL-SCNC: 26 MMOL/L (ref 21–32)
CREAT BLD-MCNC: 1.01 MG/DL
EOSINOPHIL # BLD AUTO: 0.51 X10(3) UL (ref 0–0.7)
EOSINOPHIL NFR BLD AUTO: 7 %
ERYTHROCYTE [DISTWIDTH] IN BLOOD BY AUTOMATED COUNT: 11.7 %
FASTING PATIENT LIPID ANSWER: YES
FASTING STATUS PATIENT QL REPORTED: YES
GFR SERPLBLD BASED ON 1.73 SQ M-ARVRAT: 101 ML/MIN/1.73M2 (ref 60–?)
GLOBULIN PLAS-MCNC: 4.1 G/DL (ref 2.8–4.4)
GLUCOSE BLD-MCNC: 87 MG/DL (ref 70–99)
HCT VFR BLD AUTO: 48 %
HDLC SERPL-MCNC: 47 MG/DL (ref 40–59)
HGB BLD-MCNC: 16.1 G/DL
IMM GRANULOCYTES # BLD AUTO: 0.02 X10(3) UL (ref 0–1)
IMM GRANULOCYTES NFR BLD: 0.3 %
LDLC SERPL CALC-MCNC: 161 MG/DL (ref ?–100)
LYMPHOCYTES # BLD AUTO: 2.6 X10(3) UL (ref 1–4)
LYMPHOCYTES NFR BLD AUTO: 35.5 %
MCH RBC QN AUTO: 29.5 PG (ref 26–34)
MCHC RBC AUTO-ENTMCNC: 33.5 G/DL (ref 31–37)
MCV RBC AUTO: 87.9 FL
MONOCYTES # BLD AUTO: 0.54 X10(3) UL (ref 0.1–1)
MONOCYTES NFR BLD AUTO: 7.4 %
NEUTROPHILS # BLD AUTO: 3.57 X10 (3) UL (ref 1.5–7.7)
NEUTROPHILS # BLD AUTO: 3.57 X10(3) UL (ref 1.5–7.7)
NEUTROPHILS NFR BLD AUTO: 48.6 %
NONHDLC SERPL-MCNC: 184 MG/DL (ref ?–130)
OSMOLALITY SERPL CALC.SUM OF ELEC: 284 MOSM/KG (ref 275–295)
PLATELET # BLD AUTO: 241 10(3)UL (ref 150–450)
POTASSIUM SERPL-SCNC: 4 MMOL/L (ref 3.5–5.1)
PROT SERPL-MCNC: 8.1 G/DL (ref 6.4–8.2)
RBC # BLD AUTO: 5.46 X10(6)UL
SODIUM SERPL-SCNC: 137 MMOL/L (ref 136–145)
TRIGL SERPL-MCNC: 127 MG/DL (ref 30–149)
TSI SER-ACNC: 1.17 MIU/ML (ref 0.36–3.74)
VIT D+METAB SERPL-MCNC: 29.5 NG/ML (ref 30–100)
VLDLC SERPL CALC-MCNC: 25 MG/DL (ref 0–30)
WBC # BLD AUTO: 7.3 X10(3) UL (ref 4–11)

## 2023-06-16 PROCEDURE — 82306 VITAMIN D 25 HYDROXY: CPT

## 2023-06-16 PROCEDURE — 85025 COMPLETE CBC W/AUTO DIFF WBC: CPT

## 2023-06-16 PROCEDURE — 80061 LIPID PANEL: CPT

## 2023-06-16 PROCEDURE — 84443 ASSAY THYROID STIM HORMONE: CPT

## 2023-06-16 PROCEDURE — 80053 COMPREHEN METABOLIC PANEL: CPT

## 2023-06-20 NOTE — PROGRESS NOTES
Following the three months of high-dose supplementation, and with then D3 2000 units daily for maintenance, typically repeat is not warranted. Plus this level is only slightly low.

## 2023-07-10 ENCOUNTER — TELEPHONE (OUTPATIENT)
Dept: INTERNAL MEDICINE CLINIC | Facility: CLINIC | Age: 33
End: 2023-07-10

## 2023-07-10 NOTE — TELEPHONE ENCOUNTER
Patient calling headache 2 days non stop, ringing in ears, no energy, pulse is 104. Overall not feeling well since Sunday. No availability.

## 2023-07-10 NOTE — TELEPHONE ENCOUNTER
Discussed with AD. AD reviewed chart and ringing in ears is not new. No known cardiac risk factors. Per AD, can offer in office tomorrow with him or partner if availability allows. Otherwise should be seen in UC. Called and spoke to pt. Offered appt with CS or SD tomorrow as no availability with AD. Pt agreeable. Scheduled for 40 min appt with SD tomorrow am. UC/ER warnings provided.      GERRY RODRIGUEZ

## 2023-07-10 NOTE — TELEPHONE ENCOUNTER
Called and spoke to pt. Pt said he had a BAER all day yesterday. Woke up this morning and HA was resolved, but felt like he had no energy. BAER returned in afternoon. /75, but  with no activity. Has been belching a lot, which he does not usually do. Denies SOB, but when asked about CP, pt said he had some yesterday but none today. He believes it was reflux, but does not typically have an issue with this. Pt said he is overall not feeling well. Routing to AD for review and recommendations.  Would you recommend UC or ER?

## 2023-07-11 ENCOUNTER — OFFICE VISIT (OUTPATIENT)
Dept: INTERNAL MEDICINE CLINIC | Facility: CLINIC | Age: 33
End: 2023-07-11
Payer: COMMERCIAL

## 2023-07-11 VITALS
DIASTOLIC BLOOD PRESSURE: 64 MMHG | TEMPERATURE: 98 F | OXYGEN SATURATION: 98 % | HEIGHT: 67 IN | BODY MASS INDEX: 27.31 KG/M2 | RESPIRATION RATE: 20 BRPM | HEART RATE: 77 BPM | SYSTOLIC BLOOD PRESSURE: 106 MMHG | WEIGHT: 174 LBS

## 2023-07-11 DIAGNOSIS — R11.0 NAUSEA: ICD-10-CM

## 2023-07-11 DIAGNOSIS — M79.10 MYALGIA: ICD-10-CM

## 2023-07-11 DIAGNOSIS — R51.9 ACUTE NONINTRACTABLE HEADACHE, UNSPECIFIED HEADACHE TYPE: Primary | ICD-10-CM

## 2023-07-11 DIAGNOSIS — H93.11 RIGHT-SIDED TINNITUS: ICD-10-CM

## 2023-07-11 PROCEDURE — 87635 SARS-COV-2 COVID-19 AMP PRB: CPT | Performed by: NURSE PRACTITIONER

## 2023-07-11 PROCEDURE — 3078F DIAST BP <80 MM HG: CPT | Performed by: NURSE PRACTITIONER

## 2023-07-11 PROCEDURE — 99214 OFFICE O/P EST MOD 30 MIN: CPT | Performed by: NURSE PRACTITIONER

## 2023-07-11 PROCEDURE — 3008F BODY MASS INDEX DOCD: CPT | Performed by: NURSE PRACTITIONER

## 2023-07-11 PROCEDURE — 3074F SYST BP LT 130 MM HG: CPT | Performed by: NURSE PRACTITIONER

## 2023-07-12 LAB — SARS-COV-2 RNA RESP QL NAA+PROBE: NOT DETECTED

## 2023-07-17 ENCOUNTER — TELEPHONE (OUTPATIENT)
Dept: INTERNAL MEDICINE CLINIC | Facility: CLINIC | Age: 33
End: 2023-07-17

## 2023-07-17 NOTE — TELEPHONE ENCOUNTER
LOV 7/11/23 with SD. Patient notified Juan Dus testing is negative. Patient states he is not used to headaches so often, states after his appt he still had h/a's for a couple of days then didn't have a h/a for a couple of days then they would return, none yesterday. Headaches were all in the back of his head, not worse just more often and wants to know if he should be referred to a Neurologist for evaluation. Please advise.

## 2023-07-17 NOTE — TELEPHONE ENCOUNTER
Called and spoke w/ pt notified can see JOSE however may not be able to get in for a few months, can ask to be placed on wait list. Pt requesting number to be sent via [facility] as pt is currently driving. Notified pt SD would also like to him in office for follow up since migraines persisting. Will advise in [facility]. [facility] sent.

## 2023-07-17 NOTE — TELEPHONE ENCOUNTER
Pt stated he was here on 7/11/23 saw SD for a headache and he is still having headaches. Calling to see if he would be referred to a neurologist. Pt has a bcbs ppo.  Also returning a call re : results

## 2023-07-17 NOTE — TELEPHONE ENCOUNTER
He may need to see a neurologist but unfortunately will not be able to get in for several months. Ok to give JOSE number  should be seen here for further evaluation if he has recovered from the acute illness he was seen for last week and headaches are persisting.

## 2023-07-20 ENCOUNTER — HOSPITAL ENCOUNTER (EMERGENCY)
Facility: HOSPITAL | Age: 33
Discharge: HOME | End: 2023-07-20
Attending: EMERGENCY MEDICINE
Payer: COMMERCIAL

## 2023-07-20 ENCOUNTER — TELEPHONE (OUTPATIENT)
Dept: NEUROLOGY | Age: 33
End: 2023-07-20

## 2023-07-20 ENCOUNTER — APPOINTMENT (OUTPATIENT)
Dept: CT IMAGING | Facility: HOSPITAL | Age: 33
End: 2023-07-20
Attending: EMERGENCY MEDICINE
Payer: COMMERCIAL

## 2023-07-20 VITALS
BODY MASS INDEX: 27.47 KG/M2 | HEIGHT: 67 IN | SYSTOLIC BLOOD PRESSURE: 116 MMHG | RESPIRATION RATE: 18 BRPM | DIASTOLIC BLOOD PRESSURE: 85 MMHG | HEART RATE: 72 BPM | WEIGHT: 175 LBS | OXYGEN SATURATION: 97 % | TEMPERATURE: 98 F

## 2023-07-20 DIAGNOSIS — G43.009 MIGRAINE WITHOUT AURA AND WITHOUT STATUS MIGRAINOSUS, NOT INTRACTABLE: Primary | ICD-10-CM

## 2023-07-20 LAB
ALBUMIN SERPL-MCNC: 3.9 G/DL (ref 3.4–5)
ALBUMIN/GLOB SERPL: 1 {RATIO} (ref 1–2)
ALP LIVER SERPL-CCNC: 59 U/L
ALT SERPL-CCNC: 29 U/L
ANION GAP SERPL CALC-SCNC: 5 MMOL/L (ref 0–18)
AST SERPL-CCNC: 17 U/L (ref 15–37)
BASOPHILS # BLD AUTO: 0.06 X10(3) UL (ref 0–0.2)
BASOPHILS NFR BLD AUTO: 0.6 %
BILIRUB SERPL-MCNC: 0.4 MG/DL (ref 0.1–2)
BUN BLD-MCNC: 13 MG/DL (ref 7–18)
CALCIUM BLD-MCNC: 9.4 MG/DL (ref 8.5–10.1)
CHLORIDE SERPL-SCNC: 106 MMOL/L (ref 98–112)
CO2 SERPL-SCNC: 25 MMOL/L (ref 21–32)
CREAT BLD-MCNC: 0.96 MG/DL
EGFRCR SERPLBLD CKD-EPI 2021: 107 ML/MIN/1.73M2 (ref 60–?)
EOSINOPHIL # BLD AUTO: 0.15 X10(3) UL (ref 0–0.7)
EOSINOPHIL NFR BLD AUTO: 1.6 %
ERYTHROCYTE [DISTWIDTH] IN BLOOD BY AUTOMATED COUNT: 11.5 %
GLOBULIN PLAS-MCNC: 4.1 G/DL (ref 2.8–4.4)
GLUCOSE BLD-MCNC: 89 MG/DL (ref 70–99)
HCT VFR BLD AUTO: 44.9 %
HGB BLD-MCNC: 15.1 G/DL
IMM GRANULOCYTES # BLD AUTO: 0.03 X10(3) UL (ref 0–1)
IMM GRANULOCYTES NFR BLD: 0.3 %
LYMPHOCYTES # BLD AUTO: 3.09 X10(3) UL (ref 1–4)
LYMPHOCYTES NFR BLD AUTO: 32.1 %
MCH RBC QN AUTO: 29.4 PG (ref 26–34)
MCHC RBC AUTO-ENTMCNC: 33.6 G/DL (ref 31–37)
MCV RBC AUTO: 87.4 FL
MONOCYTES # BLD AUTO: 0.64 X10(3) UL (ref 0.1–1)
MONOCYTES NFR BLD AUTO: 6.6 %
NEUTROPHILS # BLD AUTO: 5.66 X10 (3) UL (ref 1.5–7.7)
NEUTROPHILS # BLD AUTO: 5.66 X10(3) UL (ref 1.5–7.7)
NEUTROPHILS NFR BLD AUTO: 58.8 %
OSMOLALITY SERPL CALC.SUM OF ELEC: 282 MOSM/KG (ref 275–295)
PLATELET # BLD AUTO: 257 10(3)UL (ref 150–450)
POTASSIUM SERPL-SCNC: 3.8 MMOL/L (ref 3.5–5.1)
PROT SERPL-MCNC: 8 G/DL (ref 6.4–8.2)
RBC # BLD AUTO: 5.14 X10(6)UL
SODIUM SERPL-SCNC: 136 MMOL/L (ref 136–145)
WBC # BLD AUTO: 9.6 X10(3) UL (ref 4–11)

## 2023-07-20 PROCEDURE — 70496 CT ANGIOGRAPHY HEAD: CPT | Performed by: EMERGENCY MEDICINE

## 2023-07-20 PROCEDURE — 96361 HYDRATE IV INFUSION ADD-ON: CPT

## 2023-07-20 PROCEDURE — 96375 TX/PRO/DX INJ NEW DRUG ADDON: CPT

## 2023-07-20 PROCEDURE — 70498 CT ANGIOGRAPHY NECK: CPT | Performed by: EMERGENCY MEDICINE

## 2023-07-20 PROCEDURE — 85025 COMPLETE CBC W/AUTO DIFF WBC: CPT | Performed by: EMERGENCY MEDICINE

## 2023-07-20 PROCEDURE — 99285 EMERGENCY DEPT VISIT HI MDM: CPT

## 2023-07-20 PROCEDURE — 96374 THER/PROPH/DIAG INJ IV PUSH: CPT

## 2023-07-20 PROCEDURE — 80053 COMPREHEN METABOLIC PANEL: CPT | Performed by: EMERGENCY MEDICINE

## 2023-07-20 RX ORDER — KETOROLAC TROMETHAMINE 15 MG/ML
15 INJECTION, SOLUTION INTRAMUSCULAR; INTRAVENOUS ONCE
Status: COMPLETED | OUTPATIENT
Start: 2023-07-20 | End: 2023-07-20

## 2023-07-20 RX ORDER — METOCLOPRAMIDE HYDROCHLORIDE 5 MG/ML
10 INJECTION INTRAMUSCULAR; INTRAVENOUS ONCE
Status: COMPLETED | OUTPATIENT
Start: 2023-07-20 | End: 2023-07-20

## 2023-07-20 RX ORDER — DIPHENHYDRAMINE HYDROCHLORIDE 50 MG/ML
25 INJECTION INTRAMUSCULAR; INTRAVENOUS ONCE
Status: COMPLETED | OUTPATIENT
Start: 2023-07-20 | End: 2023-07-20

## 2023-07-20 RX ORDER — SODIUM CHLORIDE 9 MG/ML
INJECTION, SOLUTION INTRAVENOUS ONCE
Status: COMPLETED | OUTPATIENT
Start: 2023-07-20 | End: 2023-07-20

## 2023-07-20 RX ORDER — BUTALBITAL, ACETAMINOPHEN AND CAFFEINE 50; 325; 40 MG/1; MG/1; MG/1
1-2 TABLET ORAL EVERY 6 HOURS PRN
Qty: 10 TABLET | Refills: 0 | Status: SHIPPED | OUTPATIENT
Start: 2023-07-20 | End: 2023-07-25

## 2023-08-01 ENCOUNTER — TELEPHONE (OUTPATIENT)
Dept: INTERNAL MEDICINE CLINIC | Facility: CLINIC | Age: 33
End: 2023-08-01

## 2023-08-01 NOTE — TELEPHONE ENCOUNTER
We received request for LOV notes and any testing from [facility]-I faxed ov notes from [address] [place] [address] on 7/11/23- [email or web address]-no other testing done by our office-faxed to [phone]

## 2023-08-24 ENCOUNTER — OFFICE VISIT (OUTPATIENT)
Dept: NEUROLOGY | Facility: CLINIC | Age: 33
End: 2023-08-24
Payer: COMMERCIAL

## 2023-08-24 VITALS — BODY MASS INDEX: 27.47 KG/M2 | WEIGHT: 175 LBS | HEIGHT: 67 IN

## 2023-08-24 DIAGNOSIS — G43.011 INTRACTABLE MIGRAINE WITHOUT AURA AND WITH STATUS MIGRAINOSUS: Primary | ICD-10-CM

## 2023-08-24 PROCEDURE — 99204 OFFICE O/P NEW MOD 45 MIN: CPT | Performed by: OTHER

## 2023-08-24 PROCEDURE — 3008F BODY MASS INDEX DOCD: CPT | Performed by: OTHER

## 2023-08-24 RX ORDER — FLUOXETINE HYDROCHLORIDE 20 MG/1
20 CAPSULE ORAL DAILY
Qty: 30 CAPSULE | Refills: 11 | COMMUNITY
Start: 2022-09-07 | End: 2023-08-24

## 2023-08-24 RX ORDER — SUMATRIPTAN 50 MG/1
50 TABLET, FILM COATED ORAL EVERY 2 HOUR PRN
COMMUNITY
End: 2023-08-24

## 2023-08-24 RX ORDER — SUMATRIPTAN 50 MG/1
TABLET, FILM COATED ORAL
Qty: 9 TABLET | Refills: 0 | Status: SHIPPED | OUTPATIENT
Start: 2023-08-24

## 2023-08-24 RX ORDER — PANTOPRAZOLE SODIUM 40 MG/1
1 TABLET, DELAYED RELEASE ORAL DAILY
COMMUNITY
Start: 2022-09-07

## 2024-01-16 ENCOUNTER — MED REC SCAN ONLY (OUTPATIENT)
Dept: INTERNAL MEDICINE CLINIC | Facility: CLINIC | Age: 34
End: 2024-01-16

## 2024-01-18 ENCOUNTER — OFFICE VISIT (OUTPATIENT)
Dept: SURGERY | Facility: CLINIC | Age: 34
End: 2024-01-18

## 2024-01-18 VITALS
SYSTOLIC BLOOD PRESSURE: 121 MMHG | HEIGHT: 67 IN | HEART RATE: 105 BPM | WEIGHT: 175 LBS | DIASTOLIC BLOOD PRESSURE: 84 MMHG | BODY MASS INDEX: 27.47 KG/M2

## 2024-01-18 DIAGNOSIS — R35.0 URINARY FREQUENCY: Primary | ICD-10-CM

## 2024-01-18 LAB
BILIRUB UR QL: NEGATIVE
CLARITY UR: CLEAR
GLUCOSE UR-MCNC: NORMAL MG/DL
HGB UR QL STRIP.AUTO: NEGATIVE
KETONES UR-MCNC: NEGATIVE MG/DL
LEUKOCYTE ESTERASE UR QL STRIP.AUTO: NEGATIVE
NITRITE UR QL STRIP.AUTO: NEGATIVE
PH UR: 6.5 [PH] (ref 5–8)
PROT UR-MCNC: NEGATIVE MG/DL
SP GR UR STRIP: 1.02 (ref 1–1.03)
UROBILINOGEN UR STRIP-ACNC: NORMAL

## 2024-01-18 PROCEDURE — 3079F DIAST BP 80-89 MM HG: CPT | Performed by: UROLOGY

## 2024-01-18 PROCEDURE — 3074F SYST BP LT 130 MM HG: CPT | Performed by: UROLOGY

## 2024-01-18 PROCEDURE — 51798 US URINE CAPACITY MEASURE: CPT | Performed by: UROLOGY

## 2024-01-18 PROCEDURE — 3008F BODY MASS INDEX DOCD: CPT | Performed by: UROLOGY

## 2024-01-18 PROCEDURE — 99244 OFF/OP CNSLTJ NEW/EST MOD 40: CPT | Performed by: UROLOGY

## 2024-01-18 RX ORDER — CIPROFLOXACIN 500 MG/1
500 TABLET, FILM COATED ORAL EVERY 12 HOURS
COMMUNITY
Start: 2024-01-12

## 2024-01-18 NOTE — PROGRESS NOTES
SUBJECTIVE:  Jude Harris is a 33 year old male who presents for a consultation at the request of, and a copy of this note will be sent to, Torsten Elena, for evaluation of  urinary frquency. He states that the problem is unchanged. Symptoms include suprapubic discomfort, frequency, urgency.  IPSS score today is 6 quality-of-life index is 3.  Over the last 1 to 2 weeks his symptoms have improved.  He recently underwent a sinuplasty x 2 and COVID and states he had exacerbated lower urinary tract symptoms.  He has a history of the symptoms dating back to 2019 when he was seen by my previous partner Dr. Thornton for similar symptoms.  He was seen last week by urologist with [facility] and reportedly prescribed ciprofloxacin but he never took it as his symptoms began to improve.  No records of that visit are available to me today.  He states he underwent a digital prostate exam and was told it was unremarkable.. He denies any other complaints.  No constipation.  Denies excessive caffeine or alcohol intake.  Allergies:   Allergies   Allergen Reactions    Seasonal OTHER (SEE COMMENTS)     other    Shellfish-Derived Products ITCHING       History:  Past Medical History:   Diagnosis Date    Allergic rhinitis     Anxiety state     Depression     Esophageal reflux     Migraines       Past Surgical History:   Procedure Laterality Date    APPENDECTOMY      APPENDECTOMY      OTHER SURGICAL HISTORY  09/21/2017    cystoscopy      Family History   Problem Relation Age of Onset    Heart Disorder Maternal Grandmother       Social History:   Social History     Socioeconomic History    Marital status: Single   Tobacco Use    Smoking status: Former     Packs/day: 0.50     Years: 6.00     Additional pack years: 0.00     Total pack years: 3.00     Types: Cigarettes    Smokeless tobacco: Never   Vaping Use    Vaping Use: Never used   Substance and Sexual Activity    Alcohol use: Yes     Alcohol/week: 1.7 standard drinks of alcohol      Types: 2 Standard drinks or equivalent per week     Comment: occasional    Drug use: No   Other Topics Concern    Pt has a pacemaker No    Pt has a defibrillator No    Reaction to local anesthetic No            REVIEW OF SYSTEMS:  RESPIRATORY:  Negative for chest tightness, wheezing, cough, shortness of breath,  sputum production,chest pain or pleurisy.  CARDIOVASCULAR:  Negative for pain or chest discomfort, dizziness or fainting, palpitations, leg swelling, nocturia, or claudication.  GASTROINTESTINAL:  Negative for nausea, vomiting, diarrhea, constipation, heartburn or indigestion, abdominal pains, bloody or tarry stools.  GENERAL: Denies:  weight gain, weight loss, fever, night sweats, bone pain, malaise, and fatigue. Positive for:  none.  All other review of systems reviewed and otherwise negative    OBJECTIVE:  /84 (BP Location: Left arm, Patient Position: Sitting, Cuff Size: large)   Pulse 105   Ht 5' 7\" (1.702 m)   Wt 175 lb (79.4 kg)   BMI 27.41 kg/m²   He appears well, in no apparent distress.  Alert and oriented times three, pleasant and cooperative.  CARDIOVASCULAR:normal apical impulse  RESPIRATORY: no chest wall deformities or tenderness  ABDOMEN: abdomen is soft without significant tenderness, masses, organomegaly or guarding  Skin exam grossly normal  Intact neurologically grossly    Bladder scan for postvoid residual volume 48 mL  ASSESSMENT/PLAN  Encounter Diagnosis   Name Primary?    Urinary frequency Yes       Orders Placed This Encounter   Procedures    Urinalysis, Routine   Recommend:  - Observe symptoms for the time being.  - Will follow-up on his urinalysis from today.    Follow-up in 3 months.  He was informed to contact the office if his symptoms are worsened.    Meds This Visit:  Requested Prescriptions      No prescriptions requested or ordered in this encounter       Imaging & Referrals:  None

## 2024-02-18 ENCOUNTER — APPOINTMENT (OUTPATIENT)
Dept: GENERAL RADIOLOGY | Facility: HOSPITAL | Age: 34
End: 2024-02-18
Payer: COMMERCIAL

## 2024-02-18 ENCOUNTER — HOSPITAL ENCOUNTER (EMERGENCY)
Facility: HOSPITAL | Age: 34
Discharge: HOME | End: 2024-02-18
Attending: STUDENT IN AN ORGANIZED HEALTH CARE EDUCATION/TRAINING PROGRAM
Payer: COMMERCIAL

## 2024-02-18 VITALS
DIASTOLIC BLOOD PRESSURE: 73 MMHG | HEIGHT: 66 IN | TEMPERATURE: 99 F | WEIGHT: 177 LBS | SYSTOLIC BLOOD PRESSURE: 113 MMHG | OXYGEN SATURATION: 96 % | RESPIRATION RATE: 18 BRPM | BODY MASS INDEX: 28.45 KG/M2 | HEART RATE: 72 BPM

## 2024-02-18 DIAGNOSIS — F41.1 ANXIETY REACTION: ICD-10-CM

## 2024-02-18 DIAGNOSIS — K29.70 GASTRITIS, PRESENCE OF BLEEDING UNSPECIFIED, UNSPECIFIED CHRONICITY, UNSPECIFIED GASTRITIS TYPE: Primary | ICD-10-CM

## 2024-02-18 LAB
ALBUMIN SERPL-MCNC: 4 G/DL (ref 3.4–5)
ALBUMIN/GLOB SERPL: 1 {RATIO} (ref 1–2)
ALP LIVER SERPL-CCNC: 66 U/L
ALT SERPL-CCNC: 39 U/L
ANION GAP SERPL CALC-SCNC: 3 MMOL/L (ref 0–18)
AST SERPL-CCNC: 23 U/L (ref 15–37)
ATRIAL RATE: 75 BPM
BASOPHILS # BLD AUTO: 0.05 X10(3) UL (ref 0–0.2)
BASOPHILS NFR BLD AUTO: 0.4 %
BILIRUB SERPL-MCNC: 0.7 MG/DL (ref 0.1–2)
BUN BLD-MCNC: 12 MG/DL (ref 9–23)
CALCIUM BLD-MCNC: 10.1 MG/DL (ref 8.5–10.1)
CHLORIDE SERPL-SCNC: 106 MMOL/L (ref 98–112)
CO2 SERPL-SCNC: 29 MMOL/L (ref 21–32)
CREAT BLD-MCNC: 1.07 MG/DL
EGFRCR SERPLBLD CKD-EPI 2021: 94 ML/MIN/1.73M2 (ref 60–?)
EOSINOPHIL # BLD AUTO: 0.07 X10(3) UL (ref 0–0.7)
EOSINOPHIL NFR BLD AUTO: 0.5 %
ERYTHROCYTE [DISTWIDTH] IN BLOOD BY AUTOMATED COUNT: 11.9 %
GLOBULIN PLAS-MCNC: 4 G/DL (ref 2.8–4.4)
GLUCOSE BLD-MCNC: 107 MG/DL (ref 70–99)
HCT VFR BLD AUTO: 45.6 %
HGB BLD-MCNC: 15.6 G/DL
IMM GRANULOCYTES # BLD AUTO: 0.04 X10(3) UL (ref 0–1)
IMM GRANULOCYTES NFR BLD: 0.3 %
LIPASE SERPL-CCNC: 60 U/L (ref 13–75)
LYMPHOCYTES # BLD AUTO: 1.89 X10(3) UL (ref 1–4)
LYMPHOCYTES NFR BLD AUTO: 14.7 %
MCH RBC QN AUTO: 29.3 PG (ref 26–34)
MCHC RBC AUTO-ENTMCNC: 34.2 G/DL (ref 31–37)
MCV RBC AUTO: 85.7 FL
MONOCYTES # BLD AUTO: 0.8 X10(3) UL (ref 0.1–1)
MONOCYTES NFR BLD AUTO: 6.2 %
NEUTROPHILS # BLD AUTO: 9.97 X10 (3) UL (ref 1.5–7.7)
NEUTROPHILS # BLD AUTO: 9.97 X10(3) UL (ref 1.5–7.7)
NEUTROPHILS NFR BLD AUTO: 77.9 %
OSMOLALITY SERPL CALC.SUM OF ELEC: 286 MOSM/KG (ref 275–295)
P AXIS: 18 DEGREES
P-R INTERVAL: 184 MS
PLATELET # BLD AUTO: 262 10(3)UL (ref 150–450)
POTASSIUM SERPL-SCNC: 3.7 MMOL/L (ref 3.5–5.1)
PROT SERPL-MCNC: 8 G/DL (ref 6.4–8.2)
Q-T INTERVAL: 364 MS
QRS DURATION: 102 MS
QTC CALCULATION (BEZET): 406 MS
R AXIS: 73 DEGREES
RBC # BLD AUTO: 5.32 X10(6)UL
SODIUM SERPL-SCNC: 138 MMOL/L (ref 136–145)
T AXIS: 22 DEGREES
TROPONIN I SERPL HS-MCNC: 3 NG/L
VENTRICULAR RATE: 75 BPM
WBC # BLD AUTO: 12.8 X10(3) UL (ref 4–11)

## 2024-02-18 PROCEDURE — 84484 ASSAY OF TROPONIN QUANT: CPT

## 2024-02-18 PROCEDURE — 36415 COLL VENOUS BLD VENIPUNCTURE: CPT

## 2024-02-18 PROCEDURE — 80053 COMPREHEN METABOLIC PANEL: CPT | Performed by: STUDENT IN AN ORGANIZED HEALTH CARE EDUCATION/TRAINING PROGRAM

## 2024-02-18 PROCEDURE — 80053 COMPREHEN METABOLIC PANEL: CPT

## 2024-02-18 PROCEDURE — 85025 COMPLETE CBC W/AUTO DIFF WBC: CPT

## 2024-02-18 PROCEDURE — 71045 X-RAY EXAM CHEST 1 VIEW: CPT

## 2024-02-18 PROCEDURE — 85025 COMPLETE CBC W/AUTO DIFF WBC: CPT | Performed by: STUDENT IN AN ORGANIZED HEALTH CARE EDUCATION/TRAINING PROGRAM

## 2024-02-18 PROCEDURE — 83690 ASSAY OF LIPASE: CPT | Performed by: STUDENT IN AN ORGANIZED HEALTH CARE EDUCATION/TRAINING PROGRAM

## 2024-02-18 PROCEDURE — 84484 ASSAY OF TROPONIN QUANT: CPT | Performed by: STUDENT IN AN ORGANIZED HEALTH CARE EDUCATION/TRAINING PROGRAM

## 2024-02-18 PROCEDURE — 99285 EMERGENCY DEPT VISIT HI MDM: CPT

## 2024-02-18 PROCEDURE — 99284 EMERGENCY DEPT VISIT MOD MDM: CPT

## 2024-02-18 PROCEDURE — 93010 ELECTROCARDIOGRAM REPORT: CPT

## 2024-02-18 PROCEDURE — 93005 ELECTROCARDIOGRAM TRACING: CPT

## 2024-02-18 RX ORDER — MAGNESIUM HYDROXIDE/ALUMINUM HYDROXICE/SIMETHICONE 120; 1200; 1200 MG/30ML; MG/30ML; MG/30ML
30 SUSPENSION ORAL ONCE
Status: COMPLETED | OUTPATIENT
Start: 2024-02-18 | End: 2024-02-18

## 2024-02-18 RX ORDER — MAGNESIUM HYDROXIDE/ALUMINUM HYDROXICE/SIMETHICONE 120; 1200; 1200 MG/30ML; MG/30ML; MG/30ML
10 SUSPENSION ORAL 4 TIMES DAILY PRN
Qty: 355 ML | Refills: 0 | Status: SHIPPED | OUTPATIENT
Start: 2024-02-18

## 2024-02-18 RX ORDER — PANTOPRAZOLE SODIUM 40 MG/1
40 TABLET, DELAYED RELEASE ORAL DAILY
Qty: 14 TABLET | Refills: 0 | Status: SHIPPED | OUTPATIENT
Start: 2024-02-18 | End: 2024-03-03

## 2024-02-18 NOTE — ED PROVIDER NOTES
History     Chief Complaint   Patient presents with    Chest Pain Angina       HPI    33 year old male with history of GERD, migraines presents with chest pain.  Patient states last night he got up in the middle of the night with nausea, 3 episodes of emesis, afterwards developed epigastric/chest discomfort.  He feels very shaky and sweaty at that time.  He had a typical dinner last night with 1 shot of cognac, does not normally drink often.  At baseline ET, no dyspnea or exertional pleuritic symptoms.  He has been able to tolerate oral intake today.    Further history from family, patient has significant underlying anxiety and panic disorder.  Patient states he does get palpitations, dyspnea, reflux and bloating and belching when he has these episodes.          Past Medical History:   Diagnosis Date    Allergic rhinitis     Anxiety state     Depression     Esophageal reflux     Migraines        Past Surgical History:   Procedure Laterality Date    APPENDECTOMY      APPENDECTOMY      OTHER SURGICAL HISTORY  09/21/2017    cystoscopy       Social History     Socioeconomic History    Marital status: Single   Tobacco Use    Smoking status: Former     Packs/day: 0.50     Years: 6.00     Additional pack years: 0.00     Total pack years: 3.00     Types: Cigarettes    Smokeless tobacco: Never   Vaping Use    Vaping Use: Never used   Substance and Sexual Activity    Alcohol use: Yes     Alcohol/week: 1.7 standard drinks of alcohol     Types: 2 Standard drinks or equivalent per week     Comment: occasional    Drug use: No   Other Topics Concern    Pt has a pacemaker No    Pt has a defibrillator No    Reaction to local anesthetic No                   Physical Exam     ED Triage Vitals [02/18/24 1403]   /75   Pulse 81   Resp 17   Temp 98.8 °F (37.1 °C)   Temp src    SpO2 99 %   O2 Device None (Room air)       Physical Exam  Constitutional:       General: He is not in acute distress.  Eyes:      Extraocular Movements:  Extraocular movements intact.   Cardiovascular:      Rate and Rhythm: Normal rate and regular rhythm.      Pulses: Normal pulses.      Heart sounds: Normal heart sounds.   Pulmonary:      Effort: Pulmonary effort is normal. No respiratory distress.   Abdominal:      General: Abdomen is flat. There is no distension.      Tenderness: There is no abdominal tenderness. There is no guarding.   Musculoskeletal:         General: No swelling or deformity.      Cervical back: Normal range of motion.   Skin:     General: Skin is warm.   Neurological:      General: No focal deficit present.      Mental Status: He is alert.              ED Course     Labs Reviewed   COMP METABOLIC PANEL (14) - Abnormal; Notable for the following components:       Result Value    Glucose 107 (*)     All other components within normal limits   CBC W/ DIFFERENTIAL - Abnormal; Notable for the following components:    WBC 12.8 (*)     Neutrophil Absolute Prelim 9.97 (*)     Neutrophil Absolute 9.97 (*)     All other components within normal limits   TROPONIN I HIGH SENSITIVITY - Normal   LIPASE - Normal   CBC WITH DIFFERENTIAL WITH PLATELET    Narrative:     The following orders were created for panel order CBC With Differential With Platelet.  Procedure                               Abnormality         Status                     ---------                               -----------         ------                     CBC W/ DIFFERENTIAL[[id number]]          Abnormal            Final result                 Please view results for these tests on the individual orders.   RAINBOW DRAW LAVENDER   RAINBOW DRAW LIGHT GREEN   RAINBOW DRAW BLUE     XR CHEST AP PORTABLE  (CPT=71045)    Result Date: 2/18/2024  CONCLUSION:  Normal heart size and pulmonary vascularity.  No focal infiltrate, consolidation, effusion or pneumothorax.   LOCATION:  [facility]      Dictated by (CST): Alannah Moody MD on 2/18/2024 at 3:02 PM     Finalized by (CST): Alannah Moody MD on  2/18/2024 at 3:03 PM            [facility]     Vitals:    02/18/24 1403 02/18/24 1430 02/18/24 1515   BP: 143/75 123/78 113/73   Pulse: 81 78 72   Resp: 17 23 18   Temp: 98.8 °F (37.1 °C)     SpO2: 99% 97% 96%   Weight: 80.3 kg     Height: 167.6 cm (5' 6\")         Gastritis, GERD, pancreatitis, dyspepsia, anxiety/panic reaction on ddx, less likely primary cardiopulmonary etiology on differential.  Patient has a prescription for Zoloft which she has not yet started    ED Course as of 02/18/24 1544  ------------------------------------------------------------  Time: 02/18 1432  Comment: EKG interpretation by me: EKG sinus rhythm at a rate of 75, axis nomal, no concerning acute ischemic ST changes  ------------------------------------------------------------  Time: 02/18 1432  Comment: CXR interpretation by me with no concerning acute findings    ------------------------------------------------------------  Time: 02/18 1543  Comment: Labs with mild leukocytosis, reactive.  Reassuring renal function, LFTs, lipase.  Troponin negative.     Discussed all findings.  Rx ppi and mylanta.  Advised mom that he can start the Zoloft when he feels ready.  Patient is feeling well to be discharged.  Vitals remain stable.  Ambulating without difficulty.  Given written and verbal instructions regarding this condition and strict return precautions.   Will follow up in clinic.   Patient verbalizes understanding of instructions and follow up plan, as well as indications to return to the emergency department.      Disposition and Plan     Clinical Impression:  1. Gastritis, presence of bleeding unspecified, unspecified chronicity, unspecified gastritis type    2. Anxiety reaction        Disposition:  Discharge    Follow-up:  Torsten Woo MD  [address] [place] [address] [id number] [phone]    Follow up        Medications Prescribed:  Current Discharge Medication List        START taking these medications    Details   alum-mag  hydroxide-simethicone 200-200-20 MG/5ML Oral Suspension Take 10 mL by mouth 4 (four) times daily as needed for Indigestion.  Qty: 355 mL, Refills: 0      !! pantoprazole 40 MG Oral Tab EC Take 1 tablet (40 mg total) by mouth daily for 14 days.  Qty: 14 tablet, Refills: 0       !! - Potential duplicate medications found. Please discuss with provider.

## 2024-02-18 NOTE — ED INITIAL ASSESSMENT (HPI)
Pt c/o chest and back pain since last night, pt states it woke him up from his sleep. Pt also states he felt like his left arm \"was asleep\" and he vomited a few times. Pt states having a similar episode a few weeks ago that went away.

## 2024-02-18 NOTE — ED QUICK NOTES
Rounding Completed. Report received from Beryl ANTOINE    Plan of Care reviewed. Waiting for medications to be verified.  Elimination needs assessed.  Provided updates.    Bed is locked and in lowest position. Call light within reach.

## 2024-02-28 ENCOUNTER — MED REC SCAN ONLY (OUTPATIENT)
Dept: INTERNAL MEDICINE CLINIC | Facility: CLINIC | Age: 34
End: 2024-02-28

## 2024-02-28 ENCOUNTER — TELEPHONE (OUTPATIENT)
Dept: SURGERY | Facility: CLINIC | Age: 34
End: 2024-02-28

## 2024-03-20 ENCOUNTER — OFFICE VISIT (OUTPATIENT)
Dept: INTERNAL MEDICINE CLINIC | Facility: CLINIC | Age: 34
End: 2024-03-20
Payer: COMMERCIAL

## 2024-03-20 ENCOUNTER — LAB ENCOUNTER (OUTPATIENT)
Dept: LAB | Age: 34
End: 2024-03-20
Attending: NURSE PRACTITIONER
Payer: COMMERCIAL

## 2024-03-20 VITALS
BODY MASS INDEX: 26.71 KG/M2 | HEART RATE: 86 BPM | WEIGHT: 172.19 LBS | RESPIRATION RATE: 16 BRPM | OXYGEN SATURATION: 98 % | TEMPERATURE: 98 F | HEIGHT: 67.32 IN | SYSTOLIC BLOOD PRESSURE: 102 MMHG | DIASTOLIC BLOOD PRESSURE: 70 MMHG

## 2024-03-20 DIAGNOSIS — R10.84 GENERALIZED ABDOMINAL PAIN: ICD-10-CM

## 2024-03-20 DIAGNOSIS — R10.84 GENERALIZED ABDOMINAL PAIN: Primary | ICD-10-CM

## 2024-03-20 DIAGNOSIS — D72.829 LEUKOCYTOSIS, UNSPECIFIED TYPE: ICD-10-CM

## 2024-03-20 DIAGNOSIS — R39.82 CHRONIC BLADDER PAIN: ICD-10-CM

## 2024-03-20 DIAGNOSIS — R53.83 FATIGUE, UNSPECIFIED TYPE: ICD-10-CM

## 2024-03-20 DIAGNOSIS — E55.9 VITAMIN D DEFICIENCY: ICD-10-CM

## 2024-03-20 DIAGNOSIS — F41.9 ANXIETY: ICD-10-CM

## 2024-03-20 DIAGNOSIS — R11.0 NAUSEA: ICD-10-CM

## 2024-03-20 LAB
ALBUMIN SERPL-MCNC: 4.3 G/DL (ref 3.4–5)
ALBUMIN/GLOB SERPL: 1.1 {RATIO} (ref 1–2)
ALP LIVER SERPL-CCNC: 63 U/L
ALT SERPL-CCNC: 39 U/L
ANION GAP SERPL CALC-SCNC: 5 MMOL/L (ref 0–18)
APPEARANCE: CLEAR
AST SERPL-CCNC: 20 U/L (ref 15–37)
BASOPHILS # BLD AUTO: 0.05 X10(3) UL (ref 0–0.2)
BASOPHILS NFR BLD AUTO: 0.6 %
BILIRUB SERPL-MCNC: 0.8 MG/DL (ref 0.1–2)
BILIRUBIN: NEGATIVE
BUN BLD-MCNC: 10 MG/DL (ref 9–23)
CALCIUM BLD-MCNC: 10 MG/DL (ref 8.5–10.1)
CHLORIDE SERPL-SCNC: 106 MMOL/L (ref 98–112)
CO2 SERPL-SCNC: 26 MMOL/L (ref 21–32)
CREAT BLD-MCNC: 1.05 MG/DL
EGFRCR SERPLBLD CKD-EPI 2021: 96 ML/MIN/1.73M2 (ref 60–?)
EOSINOPHIL # BLD AUTO: 0.07 X10(3) UL (ref 0–0.7)
EOSINOPHIL NFR BLD AUTO: 0.8 %
ERYTHROCYTE [DISTWIDTH] IN BLOOD BY AUTOMATED COUNT: 12.1 %
FASTING STATUS PATIENT QL REPORTED: YES
GLOBULIN PLAS-MCNC: 4 G/DL (ref 2.8–4.4)
GLUCOSE (URINE DIPSTICK): NEGATIVE MG/DL
GLUCOSE BLD-MCNC: 101 MG/DL (ref 70–99)
HCT VFR BLD AUTO: 49 %
HGB BLD-MCNC: 16.5 G/DL
IMM GRANULOCYTES # BLD AUTO: 0.02 X10(3) UL (ref 0–1)
IMM GRANULOCYTES NFR BLD: 0.2 %
KETONES (URINE DIPSTICK): NEGATIVE MG/DL
LEUKOCYTES: NEGATIVE
LYMPHOCYTES # BLD AUTO: 1.88 X10(3) UL (ref 1–4)
LYMPHOCYTES NFR BLD AUTO: 22.2 %
MCH RBC QN AUTO: 29.6 PG (ref 26–34)
MCHC RBC AUTO-ENTMCNC: 33.7 G/DL (ref 31–37)
MCV RBC AUTO: 88 FL
MONOCYTES # BLD AUTO: 0.66 X10(3) UL (ref 0.1–1)
MONOCYTES NFR BLD AUTO: 7.8 %
MULTISTIX LOT#: NORMAL NUMERIC
NEUTROPHILS # BLD AUTO: 5.78 X10 (3) UL (ref 1.5–7.7)
NEUTROPHILS # BLD AUTO: 5.78 X10(3) UL (ref 1.5–7.7)
NEUTROPHILS NFR BLD AUTO: 68.4 %
NITRITE, URINE: NEGATIVE
OCCULT BLOOD: NEGATIVE
OSMOLALITY SERPL CALC.SUM OF ELEC: 283 MOSM/KG (ref 275–295)
PH, URINE: 7.5 (ref 4.5–8)
PLATELET # BLD AUTO: 237 10(3)UL (ref 150–450)
POTASSIUM SERPL-SCNC: 4.3 MMOL/L (ref 3.5–5.1)
PROT SERPL-MCNC: 8.3 G/DL (ref 6.4–8.2)
PROTEIN (URINE DIPSTICK): NEGATIVE MG/DL
RBC # BLD AUTO: 5.57 X10(6)UL
SODIUM SERPL-SCNC: 137 MMOL/L (ref 136–145)
SPECIFIC GRAVITY: 1.01 (ref 1–1.03)
TSI SER-ACNC: 1.12 MIU/ML (ref 0.36–3.74)
URINE-COLOR: CLEAR
UROBILINOGEN,SEMI-QN: 0.2 MG/DL (ref 0–1.9)
VIT D+METAB SERPL-MCNC: 36.4 NG/ML (ref 30–100)
WBC # BLD AUTO: 8.5 X10(3) UL (ref 4–11)

## 2024-03-20 PROCEDURE — 3078F DIAST BP <80 MM HG: CPT | Performed by: NURSE PRACTITIONER

## 2024-03-20 PROCEDURE — 80053 COMPREHEN METABOLIC PANEL: CPT

## 2024-03-20 PROCEDURE — 84443 ASSAY THYROID STIM HORMONE: CPT

## 2024-03-20 PROCEDURE — 3074F SYST BP LT 130 MM HG: CPT | Performed by: NURSE PRACTITIONER

## 2024-03-20 PROCEDURE — 82306 VITAMIN D 25 HYDROXY: CPT

## 2024-03-20 PROCEDURE — 85025 COMPLETE CBC W/AUTO DIFF WBC: CPT

## 2024-03-20 PROCEDURE — 3008F BODY MASS INDEX DOCD: CPT | Performed by: NURSE PRACTITIONER

## 2024-03-20 PROCEDURE — 99214 OFFICE O/P EST MOD 30 MIN: CPT | Performed by: NURSE PRACTITIONER

## 2024-03-20 PROCEDURE — 81003 URINALYSIS AUTO W/O SCOPE: CPT | Performed by: NURSE PRACTITIONER

## 2024-03-20 RX ORDER — PANTOPRAZOLE SODIUM 40 MG/1
40 TABLET, DELAYED RELEASE ORAL DAILY
Qty: 90 TABLET | Refills: 0 | Status: SHIPPED | OUTPATIENT
Start: 2024-03-20

## 2024-03-20 RX ORDER — AZELASTINE 1 MG/ML
2 SPRAY, METERED NASAL 2 TIMES DAILY
COMMUNITY
Start: 2023-11-27

## 2024-03-20 NOTE — PROGRESS NOTES
Jude Harris is a 34 year old male.    Chief Complaint   Patient presents with    ER F/U     ES rm - 11 - 2/18 ED f/u for Gastritis, presence of bleeding , chronicity and Chest Pain Angina.           HPI:   here for ER follow up.    In ER 2/28 for nausea and vomiting followed by epigastric and chest discomfort.  W/u completed and reviewed.  Started protonix. Took for 2 weeks then purchased otc omeprazole  feels protonix worked better.  not really feeling better.   Acid taste in mouth  intermittently.    He does struggle with anxiety  in ER MD noted he was prescribed zoloft but had not started  he did start after ER 25mg dialy x 1 then increased to 50mg  he is questioning if his nausea is secondary to sertraline.  Cannot exclude at this point.   Follows with GI at [facility] but called and first appt april.  Changes in bowel habits.  Constipation alt loose stool  today was green.  Excessive flatus.   No blood in stool  no hematemesis.  No fever or chills.  No night sweats.  He has lost weight but decreased appetite.  Persistent nausea and fatigue.      Leukocytosis  12.8.   no fever  Urine dip negative.      Hyperglycemia  glucose 107    Hx Migraines  Dr Jorgensen.    Bladder pain.  Urology Dr Haywood   Hx dyslipidemia.   in June 23.   Patient Active Problem List   Diagnosis    Abdominal discomfort in left upper quadrant    Fatigue    Esophageal reflux    Other headache syndrome    Congestion of prostate with hemorrhage    Chronic nonspecific cystitis    Chronic bladder pain     Current Outpatient Medications   Medication Sig Dispense Refill    azelastine 0.1 % Nasal Solution 2 sprays by Nasal route 2 (two) times daily.      sertraline 50 MG Oral Tab Take 1 tablet (50 mg total) by mouth daily.      pantoprazole 40 MG Oral Tab EC Take 1 tablet (40 mg total) by mouth daily. 90 tablet 0    alum-mag hydroxide-simethicone 200-200-20 MG/5ML Oral Suspension Take 10 mL by mouth 4 (four) times daily as needed for Indigestion.  355 mL 0    ciprofloxacin 500 MG Oral Tab Take 1 tablet (500 mg total) by mouth Q12H.      SUMAtriptan 50 MG Oral Tab Use at onset; repeat once after 2 hours-ONLY 2 IN 24 HR MAX. This is a 30 day supply. 9 tablet 0    NON FORMULARY Serotonin      fluticasone propionate 50 MCG/ACT Nasal Suspension 2 sprays by Nasal route daily. 16 g 3    Cholecalciferol (VITAMIN D) 1000 units Oral Tab Take by mouth daily.        Past Medical History:   Diagnosis Date    Allergic rhinitis     Anxiety state     Depression     Esophageal reflux     Migraines       Social History:  Social History     Socioeconomic History    Marital status: Single   Tobacco Use    Smoking status: Former     Packs/day: 0.50     Years: 6.00     Additional pack years: 0.00     Total pack years: 3.00     Types: Cigarettes    Smokeless tobacco: Never   Vaping Use    Vaping Use: Never used   Substance and Sexual Activity    Alcohol use: Yes     Alcohol/week: 1.7 standard drinks of alcohol     Types: 2 Standard drinks or equivalent per week     Comment: occasional    Drug use: No   Other Topics Concern    Pt has a pacemaker No    Pt has a defibrillator No    Reaction to local anesthetic No     Family History   Problem Relation Age of Onset    Heart Disorder Maternal Grandmother         Allergies  Allergies   Allergen Reactions    Seasonal OTHER (SEE COMMENTS)     other    Shellfish-Derived Products ITCHING       REVIEW OF SYSTEMS:   GENERAL HEALTH: not feeling well.   RESPIRATORY: denies shortness of breath with exertion, no cough  CARDIOVASCULAR: denies chest pain on exertion, no palpatations  GI: as above     EXAM:   /70 (BP Location: Left arm, Patient Position: Sitting, Cuff Size: large)   Pulse 86   Temp 97.8 °F (36.6 °C) (Temporal)   Resp 16   Ht 5' 7.32\" (1.71 m)   Wt 172 lb 3.2 oz (78.1 kg)   SpO2 98%   BMI 26.71 kg/m²   GENERAL: well developed, well nourished,in no apparent distress  LUNGS: normal rate without respiratory distress, lungs  clear to auscultation  CARDIO: RRR without murmur  GI: normal bowel sounds,soft, mild generalized tenderness with increased discomfort RUQ and LUQ.  No rebound or guarding.    EXTREMITIES: no edema, normal strength and tone  PSYCH: alert and oriented x 3    ASSESSMENT AND PLAN:     Encounter Diagnoses   Name Primary?    Generalized abdominal pain  check labs  US ordered to have done at [facility]  he will call for appt with GI and can cancel if improved.   Try lower dose of sertraline or even off for a few days.   Yes    Nausea     Anxiety   No suicidal or homicidal thoughts        Chronic bladder pain  per urology  urine dip negative.       Leukocytosis, unspecified type  repeat CBC     Fatigue, unspecified type      Vit d def  low in 2023.  Check with labs.     Orders Placed This Encounter   Procedures    CBC W Differential W Platelet [E]    Urine Dip, auto without Micro    Comp Metabolic Panel (14) [E]    TSH W Reflex To Free T4 [E]       Meds & Refills for this Visit:  Requested Prescriptions     Signed Prescriptions Disp Refills    pantoprazole 40 MG Oral Tab EC 90 tablet 0     Sig: Take 1 tablet (40 mg total) by mouth daily.       Imaging & Consults:  US ABDOMEN COMPLETE (CPT=76700)    No follow-ups on file.  There are no Patient Instructions on file for this visit.

## 2024-03-26 ENCOUNTER — OFFICE VISIT (OUTPATIENT)
Dept: SURGERY | Facility: CLINIC | Age: 34
End: 2024-03-26

## 2024-03-26 DIAGNOSIS — R35.0 URINARY FREQUENCY: ICD-10-CM

## 2024-03-26 DIAGNOSIS — N20.0 KIDNEY STONES: Primary | ICD-10-CM

## 2024-03-26 PROCEDURE — 99213 OFFICE O/P EST LOW 20 MIN: CPT | Performed by: UROLOGY

## 2024-03-26 NOTE — PROGRESS NOTES
Jude Harris is a 34 year old male.    HPI:     Chief Complaint   Patient presents with    Kidney Problem     Pt had recent ultrasound and was told he had a 6mm kidney stone, pt would like to discuss kidney stone and possible treatment.        34-year-old male presents for evaluation of a left-sided kidney stone detected on abdominal ultrasound ordered for intermittent mild left-sided flank pain.  I had seen the patient January 18, 2024 for evaluation of urinary frequency and urgency which reportedly has resolved.  IPSS score today is 3 quality-of-life index is 2.  No previous history of kidney stones.  Ultrasound of the abdomen performed March 21, 2024 demonstrates a questionable 6 mm echogenic focus in the left renal midpole suspicious for nonobstructive stone.  He denies any flank pain now.  No fevers chills nausea or vomiting.      HISTORY:  Past Medical History:   Diagnosis Date    Allergic rhinitis     Anxiety state     Depression     Esophageal reflux     Migraines       Past Surgical History:   Procedure Laterality Date    APPENDECTOMY      APPENDECTOMY      OTHER SURGICAL HISTORY  09/21/2017    cystoscopy      Family History   Problem Relation Age of Onset    Heart Disorder Maternal Grandmother       Social History:   Social History     Socioeconomic History    Marital status: Single   Tobacco Use    Smoking status: Former     Packs/day: 0.50     Years: 6.00     Additional pack years: 0.00     Total pack years: 3.00     Types: Cigarettes    Smokeless tobacco: Never   Vaping Use    Vaping Use: Never used   Substance and Sexual Activity    Alcohol use: Yes     Alcohol/week: 1.7 standard drinks of alcohol     Types: 2 Standard drinks or equivalent per week     Comment: occasional    Drug use: No   Other Topics Concern    Pt has a pacemaker No    Pt has a defibrillator No    Reaction to local anesthetic No        Medications (Active prior to today's visit):  Current Outpatient Medications   Medication Sig  Dispense Refill    azelastine 0.1 % Nasal Solution 2 sprays by Nasal route 2 (two) times daily.      sertraline 50 MG Oral Tab Take 1 tablet (50 mg total) by mouth daily.      pantoprazole 40 MG Oral Tab EC Take 1 tablet (40 mg total) by mouth daily. 90 tablet 0    alum-mag hydroxide-simethicone 200-200-20 MG/5ML Oral Suspension Take 10 mL by mouth 4 (four) times daily as needed for Indigestion. 355 mL 0    ciprofloxacin 500 MG Oral Tab Take 1 tablet (500 mg total) by mouth Q12H.      SUMAtriptan 50 MG Oral Tab Use at onset; repeat once after 2 hours-ONLY 2 IN 24 HR MAX. This is a 30 day supply. 9 tablet 0    NON FORMULARY Serotonin      fluticasone propionate 50 MCG/ACT Nasal Suspension 2 sprays by Nasal route daily. 16 g 3    Cholecalciferol (VITAMIN D) 1000 units Oral Tab Take by mouth daily.         Allergies:  Allergies   Allergen Reactions    Seasonal OTHER (SEE COMMENTS)     other    Shellfish-Derived Products ITCHING         ROS:       PHYSICAL EXAM:        ASSESSMENT/PLAN:   Assessment   Encounter Diagnoses   Name Primary?    Kidney stones Yes    Urinary frequency        Recommend:  - CT scan of the abdomen pelvis stone protocol.  - Follow-up in 2 to 4 weeks to review results.         Orders This Visit:  No orders of the defined types were placed in this encounter.      Meds This Visit:  Requested Prescriptions      No prescriptions requested or ordered in this encounter       Imaging & Referrals:  CT ABDOMEN+PELVIS KIDNEYSTONE 2D RNDR(NO IV,NO ORAL)(CPT=74176)     3/26/2024  Tae Bonner MD

## 2024-03-28 ENCOUNTER — TELEPHONE (OUTPATIENT)
Dept: SURGERY | Facility: CLINIC | Age: 34
End: 2024-03-28

## 2024-03-28 NOTE — TELEPHONE ENCOUNTER
Received fax from [facility] with approval of CPT 58528 CT abdomen and pelvis without contrast.     Will send copy to scanning.

## 2024-04-01 ENCOUNTER — HOSPITAL ENCOUNTER (OUTPATIENT)
Dept: CT IMAGING | Age: 34
Discharge: HOME | End: 2024-04-01
Attending: UROLOGY
Payer: COMMERCIAL

## 2024-04-01 DIAGNOSIS — N20.0 KIDNEY STONES: ICD-10-CM

## 2024-04-01 PROCEDURE — 74176 CT ABD & PELVIS W/O CONTRAST: CPT | Performed by: UROLOGY

## 2024-04-08 ENCOUNTER — OFFICE VISIT (OUTPATIENT)
Dept: SURGERY | Facility: CLINIC | Age: 34
End: 2024-04-08

## 2024-04-08 DIAGNOSIS — N20.0 KIDNEY STONES: Primary | ICD-10-CM

## 2024-04-08 PROCEDURE — 99213 OFFICE O/P EST LOW 20 MIN: CPT | Performed by: UROLOGY

## 2024-04-08 NOTE — PROGRESS NOTES
Jude Harris is a 34 year old male.    HPI:     Chief Complaint   Patient presents with    Follow - Up     Review ct scan results       34-year-old male in follow-up to a previous visit March 26, 2024.  Had an abdominal ultrasound for intermittent mild left-sided flank pain that demonstrated questionable stone.  I referred the patient for a stone protocol CT of the abdomen and pelvis April 1, 2024 showing a 4 to 5 mm stone on the right side contralateral to his previous symptoms and a punctate stone in the left side.  No hydronephrosis or ureteral stones identified.  He feels well.  States the pain previously described is mostly resolved but he has occasional twinges of the pain in the left upper abdominal quadrant.      HISTORY:  Past Medical History:   Diagnosis Date    Allergic rhinitis     Anxiety state     Depression     Esophageal reflux     Migraines       Past Surgical History:   Procedure Laterality Date    APPENDECTOMY      APPENDECTOMY      OTHER SURGICAL HISTORY  09/21/2017    cystoscopy      Family History   Problem Relation Age of Onset    Heart Disorder Maternal Grandmother       Social History:   Social History     Socioeconomic History    Marital status: Single   Tobacco Use    Smoking status: Former     Packs/day: 0.50     Years: 6.00     Additional pack years: 0.00     Total pack years: 3.00     Types: Cigarettes    Smokeless tobacco: Never   Vaping Use    Vaping Use: Never used   Substance and Sexual Activity    Alcohol use: Yes     Alcohol/week: 1.7 standard drinks of alcohol     Types: 2 Standard drinks or equivalent per week     Comment: occasional    Drug use: No   Other Topics Concern    Pt has a pacemaker No    Pt has a defibrillator No    Reaction to local anesthetic No        Medications (Active prior to today's visit):  Current Outpatient Medications   Medication Sig Dispense Refill    azelastine 0.1 % Nasal Solution 2 sprays by Nasal route 2 (two) times daily.      sertraline 50 MG Oral  Tab Take 1 tablet (50 mg total) by mouth daily.      pantoprazole 40 MG Oral Tab EC Take 1 tablet (40 mg total) by mouth daily. 90 tablet 0    alum-mag hydroxide-simethicone 200-200-20 MG/5ML Oral Suspension Take 10 mL by mouth 4 (four) times daily as needed for Indigestion. 355 mL 0    ciprofloxacin 500 MG Oral Tab Take 1 tablet (500 mg total) by mouth Q12H.      SUMAtriptan 50 MG Oral Tab Use at onset; repeat once after 2 hours-ONLY 2 IN 24 HR MAX. This is a 30 day supply. 9 tablet 0    NON FORMULARY Serotonin      fluticasone propionate 50 MCG/ACT Nasal Suspension 2 sprays by Nasal route daily. 16 g 3    Cholecalciferol (VITAMIN D) 1000 units Oral Tab Take by mouth daily.         Allergies:  Allergies   Allergen Reactions    Seasonal OTHER (SEE COMMENTS)     other    Shellfish-Derived Products ITCHING         ROS:       PHYSICAL EXAM:        ASSESSMENT/PLAN:   Assessment   Encounter Diagnosis   Name Primary?    Kidney stones Yes       Reviewed findings at length with patient.  I told the patient that his pain is likely not related to his stones which appears nonobstructing.  We discussed a stone friendly diet.  We agreed that he would follow-up otherwise on a as needed basis.         Orders This Visit:  No orders of the defined types were placed in this encounter.      Meds This Visit:  Requested Prescriptions      No prescriptions requested or ordered in this encounter       Imaging & Referrals:  None     4/8/2024  Tae Bonner MD

## 2024-04-11 ENCOUNTER — MED REC SCAN ONLY (OUTPATIENT)
Dept: INTERNAL MEDICINE CLINIC | Facility: CLINIC | Age: 34
End: 2024-04-11

## 2024-05-06 ENCOUNTER — MED REC SCAN ONLY (OUTPATIENT)
Dept: INTERNAL MEDICINE CLINIC | Facility: CLINIC | Age: 34
End: 2024-05-06

## 2024-05-06 PROBLEM — A04.8 H. PYLORI INFECTION: Status: ACTIVE | Noted: 2024-05-06

## 2024-07-05 ENCOUNTER — MED REC SCAN ONLY (OUTPATIENT)
Dept: INTERNAL MEDICINE CLINIC | Facility: CLINIC | Age: 34
End: 2024-07-05

## 2025-01-06 ENCOUNTER — TELEPHONE (OUTPATIENT)
Dept: INTERNAL MEDICINE CLINIC | Facility: CLINIC | Age: 35
End: 2025-01-06

## 2025-01-06 NOTE — TELEPHONE ENCOUNTER
Received consultation from GI.  Patient is overdue for CPE.  Last ov 3/24 was acute visit/ER follow up.  Last CPE 3/2023  Please call to schedule.

## 2025-02-26 NOTE — PROGRESS NOTES
[facility]rology  Follow Up Visit    HPI:   Jude Harris is a 34 year old male here today for UTI symptoms. The patient was last seen by Dr Bonner on 04/08/2024.     The patient states he was seen in urgent care on 02/09 with c/o frequency, dysuria, and suprapubic pain. These began several days prior. He denied back pain, fever, chills, nausea, and vomiting. He was given a course of Bactrim DS x 14 days and his symptoms improved. His urine dip showed small blood and 7.5 pH. Was negative for nitrites and leukocytes. His urine culture was negative at the time. He currently denies urinary symptoms.     Past Medical History:    Allergic rhinitis    Anxiety state    Depression    Esophageal reflux    Migraines     Past Surgical History:   Procedure Laterality Date    Appendectomy      Appendectomy      Other surgical history  09/21/2017    cystoscopy     Family History   Problem Relation Age of Onset    Heart Disorder Maternal Grandmother      Social History     Socioeconomic History    Marital status: Single   Tobacco Use    Smoking status: Former     Current packs/day: 0.50     Average packs/day: 0.5 packs/day for 6.0 years (3.0 ttl pk-yrs)     Types: Cigarettes    Smokeless tobacco: Never   Vaping Use    Vaping status: Never Used   Substance and Sexual Activity    Alcohol use: Yes     Alcohol/week: 1.7 standard drinks of alcohol     Types: 2 Standard drinks or equivalent per week     Comment: occasional    Drug use: No   Other Topics Concern    Pt has a pacemaker No    Pt has a defibrillator No    Reaction to local anesthetic No     Current Outpatient Medications   Medication Sig Dispense Refill    azelastine 0.1 % Nasal Solution 2 sprays by Nasal route 2 (two) times daily.      sertraline 50 MG Oral Tab Take 1 tablet (50 mg total) by mouth daily.      pantoprazole 40 MG Oral Tab EC Take 1 tablet (40 mg total) by mouth daily. 90 tablet 0    alum-mag hydroxide-simethicone 200-200-20 MG/5ML Oral Suspension Take 10  mL by mouth 4 (four) times daily as needed for Indigestion. 355 mL 0    ciprofloxacin 500 MG Oral Tab Take 1 tablet (500 mg total) by mouth Q12H.      SUMAtriptan 50 MG Oral Tab Use at onset; repeat once after 2 hours-ONLY 2 IN 24 HR MAX. This is a 30 day supply. 9 tablet 0    NON FORMULARY Serotonin      fluticasone propionate 50 MCG/ACT Nasal Suspension 2 sprays by Nasal route daily. 16 g 3    Cholecalciferol (VITAMIN D) 1000 units Oral Tab Take by mouth daily.         Allergies: Seasonal and Shellfish-derived products    REVIEW OF SYSTEMS:  Review of Systems   All other systems reviewed and are negative.        EXAM:  There were no vitals taken for this visit.    Physical Exam  Constitutional:       Appearance: Normal appearance.   HENT:      Head: Normocephalic and atraumatic.      Mouth/Throat:      Mouth: Mucous membranes are moist.   Pulmonary:      Effort: Pulmonary effort is normal.   Abdominal:      General: Abdomen is flat.      Palpations: Abdomen is soft.   Skin:     General: Skin is warm and dry.   Neurological:      Mental Status: He is alert and oriented to person, place, and time.   Psychiatric:         Mood and Affect: Mood normal.         Thought Content: Thought content normal.          LABS:  No results found for: \"PSA\", \"QPSA\", \"TOTPSASCREEN\"  Lab Results   Component Value Date    WBC 8.5 03/20/2024    RBC 5.57 03/20/2024    HGB 16.5 03/20/2024    HCT 49.0 03/20/2024    MCV 88.0 03/20/2024    MCH 29.6 03/20/2024    MCHC 33.7 03/20/2024    RDW 12.1 03/20/2024    .0 03/20/2024    MPV 8.6 01/06/2015     Lab Results   Component Value Date     (H) 03/20/2024    BUN 10 03/20/2024    BUNCREA 14.3 01/06/2015    CREATSERUM 1.05 03/20/2024    ANIONGAP 5 03/20/2024    GFRNAA 100 04/07/2018    GFRAA 115 04/07/2018    CA 10.0 03/20/2024     03/20/2024    K 4.3 03/20/2024     03/20/2024    CO2 26.0 03/20/2024     No results found for: \"PTP\", \"PT\", \"INR\"    IMAGING:  No results  found.    IMPRESSION:  Lower Urinary Tract Symptoms    PLAN:  - UA with Reflex Culture  - Follow-up as needed    Raeann Aviles, GUZMAN  03/10/2025

## 2025-03-10 ENCOUNTER — OFFICE VISIT (OUTPATIENT)
Dept: SURGERY | Facility: CLINIC | Age: 35
End: 2025-03-10

## 2025-03-10 DIAGNOSIS — R39.9 LOWER URINARY TRACT SYMPTOMS: Primary | ICD-10-CM

## 2025-03-10 LAB
BILIRUB UR QL: NEGATIVE
CLARITY UR: CLEAR
COLOR UR: COLORLESS
GLUCOSE UR-MCNC: NORMAL MG/DL
HGB UR QL STRIP.AUTO: NEGATIVE
KETONES UR-MCNC: NEGATIVE MG/DL
LEUKOCYTE ESTERASE UR QL STRIP.AUTO: NEGATIVE
NITRITE UR QL STRIP.AUTO: NEGATIVE
PH UR: 6 [PH] (ref 5–8)
PROT UR-MCNC: NEGATIVE MG/DL
SP GR UR STRIP: <1.005 (ref 1–1.03)
UROBILINOGEN UR STRIP-ACNC: NORMAL

## 2025-03-10 PROCEDURE — 99214 OFFICE O/P EST MOD 30 MIN: CPT

## (undated) DEVICE — LUBRICANT JLY SURGILUBE 2OZ

## (undated) DEVICE — UROLOGY DRAIN BAG STERILE

## (undated) DEVICE — CYSTO PACK: Brand: MEDLINE INDUSTRIES, INC.

## (undated) DEVICE — ISOVUE 300 10X100ML VIAL

## (undated) DEVICE — SOL H2O 3000ML IRRIG

## (undated) DEVICE — SOL  .9 1000ML BTL

## (undated) DEVICE — CATH URET CONE TIP 8FR 138008

## (undated) DEVICE — STERILE LATEX POWDER-FREE SURGICAL GLOVESWITH NITRILE COATING: Brand: PROTEXIS

## (undated) DEVICE — COVER SGL STRL LGHT HNDL BLU

## (undated) NOTE — LETTER
No referring provider defined for this encounter. 08/17/17        Patient: Narinder Zavaleta   YOB: 1990   Date of Visit: 8/17/2017       Dear  Dr. Bui July,      Thank you for referring Narinder Zavaleta to my practice.   Please find 1.  Your symptoms suggest chronic nonspecific cystitis disorder--could be viral; could be autoimmune; could be eosinophilic cystitis from some type of allergic reaction of the bladder.     2.  Urine specimen today for complete urinalysis, urine culture and

## (undated) NOTE — LETTER
No referring provider defined for this encounter. 02/15/19        Patient: Cr Roberts   YOB: 1990   Date of Visit: 2/14/2019       Dear  Dr. Tim Moise,      I evaluated    Davidjohn Armando in the office.   Please find my assessm weak stream; nocturia 0-1 time a night. Occasionally has a sense of incomplete emptying the bladder.   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 Katherine Velázquez discussed; recommended that patient consider starting Zyrtec (cetirizine) as recommended by Dr. Katherine Velázquez      Review of Systems   Constitutional: Negative for fever. HENT: Negative for voice change.     Respiratory: Negative for chest tig Types: 2 Standard drinks or equivalent per week      Comment: occasional    Drug use: No       Over the past month, how often have you had a sensation of not emptying your bladder completely after you finish urinating?: Less than 1 time in 5  Over the Pulse: 67   Weight: 170 lb (77.1 kg)   Height: 5' 7\" (1.702 m)         Body mass index is 26.63 kg/m². LABORATORIES      2/11/19 urine culture negative. 2/11/19 UA blood negative and microscopic not indicated.   04/07/2018 UA Occult blood = Negative  0 of the bladder. It was better after that and then he had a flareup in September 2018 of suprapubic pain, chronic. I recommended that pt see allergist for cause of irritation of the inner lining of the bladder.  On 9/05/2018  saw [place] [facility] an exercise on a treadmill for at least 30 minutes 3 times a week    4. I recommend avoiding cystoscopy surgery under general anesthesia unless if you develop constant bladder pain that will not go away for a longer period of time.   We do not not want to [facility]

## (undated) NOTE — LETTER
August 21, 2017         [address]  [place] [address]      Patient: Josué Machuca   YOB: 1990   Date of Visit: 8/17/2017     Dear  Dr. Jreemiah Fernandez,    Thank you for referring Josué Machuca to [facility].  Your symptoms suggest chronic nonspecific cystitis disorder--could be viral; could be autoimmune; could be eosinophilic cystitis from some type of allergic reaction of the bladder.     2.  Urine specimen today for complete urinalysis, urine culture and

## (undated) NOTE — LETTER
No referring provider defined for this encounter. 09/15/17        Patient: Jimmie Meadows   YOB: 1990   Date of Visit: 9/15/2017       Dear  Dr. Navi Marin,      Thank you for referring Meherrinalmita Meadows to my practice.   Please find voiding dysfunction category. Discussed pt starting an intake voiding diary and he agrees with this plan. Treatment Plan & Patient Instructions    1. Urine specimen today for cytology    2.   Intake voiding diary--please tabulated; my nurses will e

## (undated) NOTE — ED AVS SNAPSHOT
Marsha Tramaine   MRN: [id number]    Department:  [facility] Emergency Department   Date of Visit:  4/7/2018           Disclosure     Insurance plans vary and the physician(s) referred by the ER may not be covered by your plan.  Please contact your ins tell this physician (or your personal doctor if your instructions are to return to your personal doctor) about any new or lasting problems. The primary care or specialist physician will see patients referred from the [facility] Emergency Department.  Lefty Cabral

## (undated) NOTE — LETTER
September 25, 2017         [facility] [address]  [facility]  [address] [place] [address]      Patient: Adriel Rosas   YOB: 1990   Date of Visit: 9/15/2017       Dear  Dr. Lee Favorite,      Thank you for referring Mark Samuel unremarkable urinary bladder. Patient has current AUA score of 8, moderate voiding dysfunction category. Discussed pt starting an intake voiding diary and he agrees with this plan. Treatment Plan & Patient Instructions    1.   Urine specimen today f

## (undated) NOTE — LETTER
No referring provider defined for this encounter. 12/05/18        Patient: Lakeisha Harman   YOB: 1990   Date of Visit: 12/5/2018       Dear  Dr. Stefan Camilo,      Thank you for referring Lakeisha Harman to my practice.   Please find feels that the voiding dysfunction is stable compared to last time. Patient states he feels \"most dissatisfied\" about his urinating problem. 3. Abnormal Laboratory Values  On 8/17/17  He received a UA which showed pH Urine - 8.0; WBC - 0; RBC - 0.  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 Disp:  Rfl:    Omega-3 Fatty Acids (FISH OIL) 1360 MG Oral Cap Take by mouth daily. Disp:  Rfl:    Citalopram Hydrobromide 10 MG Oral Tab Take 10 mg by mouth daily.    Disp:  Rfl: 4     Allergies:  Shell Body mass index is 26.63 kg/m².     LABORATORIES    04/07/2018 UA Occult blood = Negative  08/25/2018 Creatinine = 0.9; eGFR = >60  8/17/17 UA; pH Urine - 8.0; WBC - 0; RBC - 0; No Growth  8/17/17 Urine Cytology shows atypical urothelial cells present    IM cat dog, cr, and feathers. I suspect  non-specific bladder symptoms to be a manifestation of multiple allergies.  I ordered urinalysis and urine culture and consider taking daily cetrizine 10 mg qhs prn and also take Flonase 2 sprays in each nostril qD As p